# Patient Record
Sex: MALE | Race: WHITE | NOT HISPANIC OR LATINO | Employment: FULL TIME | URBAN - METROPOLITAN AREA
[De-identification: names, ages, dates, MRNs, and addresses within clinical notes are randomized per-mention and may not be internally consistent; named-entity substitution may affect disease eponyms.]

---

## 2017-11-26 ENCOUNTER — HOSPITAL ENCOUNTER (EMERGENCY)
Facility: CLINIC | Age: 47
Discharge: HOME | End: 2017-11-26
Attending: EMERGENCY MEDICINE | Admitting: EMERGENCY MEDICINE
Payer: COMMERCIAL

## 2017-11-26 ENCOUNTER — APPOINTMENT (OUTPATIENT)
Dept: CT IMAGING | Facility: CLINIC | Age: 47
End: 2017-11-26
Attending: EMERGENCY MEDICINE
Payer: COMMERCIAL

## 2017-11-26 VITALS
HEIGHT: 69 IN | OXYGEN SATURATION: 98 % | WEIGHT: 155 LBS | HEART RATE: 105 BPM | RESPIRATION RATE: 16 BRPM | TEMPERATURE: 98.8 F | BODY MASS INDEX: 22.96 KG/M2 | DIASTOLIC BLOOD PRESSURE: 71 MMHG | SYSTOLIC BLOOD PRESSURE: 114 MMHG

## 2017-11-26 DIAGNOSIS — A08.4 VIRAL GASTROENTERITIS: Primary | ICD-10-CM

## 2017-11-26 DIAGNOSIS — R11.2 NON-INTRACTABLE VOMITING WITH NAUSEA, UNSPECIFIED VOMITING TYPE: ICD-10-CM

## 2017-11-26 LAB
ALBUMIN SERPL-MCNC: 3.9 G/DL (ref 3.4–5)
ALP SERPL-CCNC: 46 U/L (ref 40–150)
ALT SERPL W P-5'-P-CCNC: 30 U/L (ref 0–70)
ANION GAP SERPL CALCULATED.3IONS-SCNC: 7 MMOL/L (ref 3–14)
AST SERPL W P-5'-P-CCNC: 15 U/L (ref 0–45)
BASOPHILS # BLD AUTO: 0 10E9/L (ref 0–0.2)
BASOPHILS NFR BLD AUTO: 0.1 %
BILIRUB SERPL-MCNC: 0.5 MG/DL (ref 0.2–1.3)
BUN SERPL-MCNC: 20 MG/DL (ref 7–30)
CALCIUM SERPL-MCNC: 8.5 MG/DL (ref 8.5–10.1)
CHLORIDE SERPL-SCNC: 104 MMOL/L (ref 94–109)
CO2 SERPL-SCNC: 30 MMOL/L (ref 20–32)
CREAT SERPL-MCNC: 0.83 MG/DL (ref 0.66–1.25)
DIFFERENTIAL METHOD BLD: ABNORMAL
EOSINOPHIL # BLD AUTO: 0 10E9/L (ref 0–0.7)
EOSINOPHIL NFR BLD AUTO: 0.2 %
ERYTHROCYTE [DISTWIDTH] IN BLOOD BY AUTOMATED COUNT: 11.7 % (ref 10–15)
GFR SERPL CREATININE-BSD FRML MDRD: >90 ML/MIN/1.7M2
GLUCOSE SERPL-MCNC: 120 MG/DL (ref 70–99)
HCT VFR BLD AUTO: 41 % (ref 40–53)
HGB BLD-MCNC: 14.6 G/DL (ref 13.3–17.7)
IMM GRANULOCYTES # BLD: 0 10E9/L (ref 0–0.4)
IMM GRANULOCYTES NFR BLD: 0.1 %
LIPASE SERPL-CCNC: 281 U/L (ref 73–393)
LYMPHOCYTES # BLD AUTO: 0.4 10E9/L (ref 0.8–5.3)
LYMPHOCYTES NFR BLD AUTO: 3.1 %
MCH RBC QN AUTO: 30.2 PG (ref 26.5–33)
MCHC RBC AUTO-ENTMCNC: 35.6 G/DL (ref 31.5–36.5)
MCV RBC AUTO: 85 FL (ref 78–100)
MONOCYTES # BLD AUTO: 0.7 10E9/L (ref 0–1.3)
MONOCYTES NFR BLD AUTO: 5.3 %
NEUTROPHILS # BLD AUTO: 11.1 10E9/L (ref 1.6–8.3)
NEUTROPHILS NFR BLD AUTO: 91.2 %
PLATELET # BLD AUTO: 209 10E9/L (ref 150–450)
POTASSIUM SERPL-SCNC: 3.4 MMOL/L (ref 3.4–5.3)
PROT SERPL-MCNC: 6.9 G/DL (ref 6.8–8.8)
RBC # BLD AUTO: 4.83 10E12/L (ref 4.4–5.9)
SODIUM SERPL-SCNC: 141 MMOL/L (ref 133–144)
WBC # BLD AUTO: 12.2 10E9/L (ref 4–11)

## 2017-11-26 PROCEDURE — 25000128 H RX IP 250 OP 636: Performed by: EMERGENCY MEDICINE

## 2017-11-26 PROCEDURE — 25000132 ZZH RX MED GY IP 250 OP 250 PS 637: Performed by: EMERGENCY MEDICINE

## 2017-11-26 PROCEDURE — 96375 TX/PRO/DX INJ NEW DRUG ADDON: CPT

## 2017-11-26 PROCEDURE — 80053 COMPREHEN METABOLIC PANEL: CPT | Performed by: EMERGENCY MEDICINE

## 2017-11-26 PROCEDURE — 96374 THER/PROPH/DIAG INJ IV PUSH: CPT

## 2017-11-26 PROCEDURE — 99285 EMERGENCY DEPT VISIT HI MDM: CPT | Mod: 25

## 2017-11-26 PROCEDURE — 83690 ASSAY OF LIPASE: CPT | Performed by: EMERGENCY MEDICINE

## 2017-11-26 PROCEDURE — 96361 HYDRATE IV INFUSION ADD-ON: CPT

## 2017-11-26 PROCEDURE — 74177 CT ABD & PELVIS W/CONTRAST: CPT

## 2017-11-26 PROCEDURE — 85025 COMPLETE CBC W/AUTO DIFF WBC: CPT | Performed by: EMERGENCY MEDICINE

## 2017-11-26 RX ORDER — DICYCLOMINE HCL 20 MG
20 TABLET ORAL ONCE
Status: COMPLETED | OUTPATIENT
Start: 2017-11-26 | End: 2017-11-26

## 2017-11-26 RX ORDER — ONDANSETRON 2 MG/ML
4 INJECTION INTRAMUSCULAR; INTRAVENOUS ONCE
Status: COMPLETED | OUTPATIENT
Start: 2017-11-26 | End: 2017-11-26

## 2017-11-26 RX ORDER — DIPHENHYDRAMINE HYDROCHLORIDE 50 MG/ML
25 INJECTION INTRAMUSCULAR; INTRAVENOUS ONCE
Status: DISCONTINUED | OUTPATIENT
Start: 2017-11-26 | End: 2017-11-26

## 2017-11-26 RX ORDER — ACETAMINOPHEN 325 MG/1
650 TABLET ORAL ONCE
Status: COMPLETED | OUTPATIENT
Start: 2017-11-26 | End: 2017-11-26

## 2017-11-26 RX ORDER — DIPHENHYDRAMINE HYDROCHLORIDE 50 MG/ML
50 INJECTION INTRAMUSCULAR; INTRAVENOUS ONCE
Status: COMPLETED | OUTPATIENT
Start: 2017-11-26 | End: 2017-11-26

## 2017-11-26 RX ORDER — ONDANSETRON 4 MG/1
4 TABLET, ORALLY DISINTEGRATING ORAL EVERY 8 HOURS PRN
Qty: 10 TABLET | Refills: 0 | Status: SHIPPED | OUTPATIENT
Start: 2017-11-26 | End: 2023-03-07

## 2017-11-26 RX ORDER — HALOPERIDOL 5 MG/ML
2.5 INJECTION INTRAMUSCULAR ONCE
Status: DISCONTINUED | OUTPATIENT
Start: 2017-11-26 | End: 2017-11-26

## 2017-11-26 RX ORDER — HALOPERIDOL 5 MG/ML
5 INJECTION INTRAMUSCULAR ONCE
Status: COMPLETED | OUTPATIENT
Start: 2017-11-26 | End: 2017-11-26

## 2017-11-26 RX ORDER — DICYCLOMINE HCL 20 MG
20 TABLET ORAL 2 TIMES DAILY
Qty: 20 TABLET | Refills: 0 | Status: SHIPPED | OUTPATIENT
Start: 2017-11-26 | End: 2017-12-06

## 2017-11-26 RX ORDER — IOPAMIDOL 755 MG/ML
78 INJECTION, SOLUTION INTRAVASCULAR ONCE
Status: COMPLETED | OUTPATIENT
Start: 2017-11-26 | End: 2017-11-26

## 2017-11-26 RX ADMIN — ONDANSETRON 4 MG: 2 INJECTION INTRAMUSCULAR; INTRAVENOUS at 00:55

## 2017-11-26 RX ADMIN — HALOPERIDOL LACTATE 5 MG: 5 INJECTION, SOLUTION INTRAMUSCULAR at 02:18

## 2017-11-26 RX ADMIN — ACETAMINOPHEN 650 MG: 325 TABLET, FILM COATED ORAL at 02:18

## 2017-11-26 RX ADMIN — DIPHENHYDRAMINE HYDROCHLORIDE 50 MG: 50 INJECTION, SOLUTION INTRAMUSCULAR; INTRAVENOUS at 04:39

## 2017-11-26 RX ADMIN — IOPAMIDOL 78 ML: 755 INJECTION, SOLUTION INTRAVENOUS at 05:06

## 2017-11-26 RX ADMIN — SODIUM CHLORIDE 1000 ML: 9 INJECTION, SOLUTION INTRAVENOUS at 04:40

## 2017-11-26 RX ADMIN — DEXTROSE AND SODIUM CHLORIDE 2000 ML: 5; 900 INJECTION, SOLUTION INTRAVENOUS at 00:55

## 2017-11-26 RX ADMIN — DICYCLOMINE HYDROCHLORIDE 20 MG: 20 TABLET ORAL at 04:39

## 2017-11-26 ASSESSMENT — ENCOUNTER SYMPTOMS
DYSURIA: 0
DIARRHEA: 0
VOMITING: 1
ABDOMINAL PAIN: 1
NAUSEA: 1
FEVER: 0

## 2017-11-26 NOTE — ED PROVIDER NOTES
"  History     Chief Complaint:  Nausea & Vomiting    The history is provided by the patient.      Padilla Damon is a 47 year old male who presents via EMS with nausea and vomiting. The patient's 3 year old daughter vomited on him 3 days ago. She has had vomiting and diarrhea since then. Yesterday the patient developed nausea at 1500 and started vomiting at 2000. He vomited several times and now has diffuse abdominal pain. Describes single episode of diarrhea tonight.  No blood in stool.  He denies any fever, dysuria, or other medical concerns. Of note, his symptoms improved after given oral Zofran by EMS.  Denies abdominal surgeries.  Unable to tolerate oral intake due to vomiting.     Allergies:  No known drug allergies      Medications:    The patient is not currently taking any prescribed medications.     Past Medical History:    The patient does not have any past pertinent medical history.     Past Surgical History:    History reviewed. No pertinent surgical history.     Family History:    History reviewed. No pertinent family history.      Social History:  Presents via EMS   Tobacco use: Never  Alcohol use: No  PCP: Physician No Ref-Primary    Marital Status:      Review of Systems   Constitutional: Negative for fever.   Gastrointestinal: Positive for abdominal pain, nausea and vomiting. Negative for diarrhea.   Genitourinary: Negative for dysuria.   All other systems reviewed and are negative.    Physical Exam     Patient Vitals for the past 24 hrs:   BP Temp Temp src Pulse Heart Rate Resp SpO2 Height Weight   11/26/17 0531 114/71 - - - 97 16 98 % - -   11/26/17 0436 122/65 - - 105 - 16 96 % - -   11/26/17 0324 106/77 - - - - - - - -   11/26/17 0222 118/79 - - 96 - 20 98 % - -   11/26/17 0118 108/73 - - 97 - 18 99 % - -   11/26/17 0018 119/81 98.8  F (37.1  C) Oral 88 - 16 94 % 1.753 m (5' 9\") 70.3 kg (155 lb)      Physical Exam  General: Alert, appears well-developed and well-nourished. Cooperative. "     In moderate distress  HEENT:  Head:  Atraumatic  Ears:  External ears are normal  Mouth/Throat:  Oropharynx is without erythema or exudate and mucous membranes are dry.   Eyes:   Conjunctivae normal and EOM are normal. No scleral icterus.    Pupils are equal, round, and reactive to light.   CV:  Normal rate, regular rhythm, normal heart sounds and radial and dorsalis pedis pulses are 2+ and symmetric.  No murmur.  Resp:  Breath sounds are clear bilaterally    Non-labored, no retractions or accessory muscle use  GI:  Abdomen is soft, no distension, no tenderness. No rebound or guarding. No CVA tenderness bilaterally  MS:  Normal range of motion. No edema.    Normal strength in all 4 extremities.     Back atraumatic.  Skin:  Warm and dry.  No rash or lesions noted.  Neuro: Alert. Normal strength.  Sensation intact in all 4 extremities. GCS: 15  Psych:  Normal mood and affect.  Lymph: No anterior or posterior cervical lymphadenopathy noted.       Emergency Department Course   Imaging:  Radiographic findings were communicated with the patient who voiced understanding of the findings.  CT Abdomen Pelvis w Contrast  IMPRESSION:  1. Increased small bowel and colonic fluid is nonspecific but may be due to enteritis.  2. No other acute findings.    GUCCI BRADY MD    Imaging independently reviewed and agree with radiologist interpretation.     Laboratory:  CBC: WBC 12.2 (H), o/w WNL (HGB 14.6, )   CMP: Glucose 120 (H), o/w WNL (Creatinine 0.83)  Lipase: 281     Interventions:  0055: Dextrose 5% and 0.9% NaCl Bolus 2000 mLs  0055: Zofran 4mg IV injection    0218: Haldol 5 mg IV  0218: Tylenol 650 mg PO  0439: Bentyl 20 mg PO  0439: Benadryl 50 mg IV  0440: NS 1L IV Bolus     Emergency Department Course:  Past medical records, nursing notes, and vitals reviewed.  0028: I performed an exam of the patient and obtained history, as documented above.    0213: I rechecked the patient. Explained findings to the  "patient.     0540: I rechecked the patient. Findings and plan explained to the Patient. Patient discharged home with instructions regarding supportive care, medications, and reasons to return. The importance of close follow-up was reviewed.      I personally reviewed the laboratory results with the patient and answered all related questions prior to discharge.   Impression & Plan    Medical Decision Making:  Padilla Damon is a 47 year old male who presents with significant vomiting since 1500 yesterday and vomiting since 1830 yesterday evening. Patient also describes some mild diarrhea and one child with similar norovirus symptoms at home. I suspect this is most likely a viral gastroenteritis. Patient has crampy abdominal discomfort throughout his abdomen. Patient has a long course while here in the ED due to continued nausea and abdominal discomfort. We provided D5 normal saline and Zofran initially. Patient had continued abdominal cramping. We then progressed to haldol for possible second line agent for the abdominal discomfort. Patient had some mild akathisia and restless leg with the haldol administration. Benadryl was given subsequently for a possible side effect. Patient attempted to take Bentyl for some abdominal cramping as a third line agent and something he possibly could go home with assuming he has improved symptoms. He did have some mild improvement with the Bentyl and was able to tolerate PO here in the ED.     Patient had significant abdominal pain and nausea during his stay in the ED but no vomiting witnessed by me, the nurse, or the tech during his stay in the ED for approximately 5 hours. Due to my inability to encourage the patient to initially eat food and fluids and his persistence that this abdominal pain was the \"worst pain he's ever had,\" I did obtain a CT scan out of concern for possible SBO vs diverticulitis as his pain is diffuse and associated with mild guarding on my re-check.  CT does " show likely enteritis. No evidence of a SBO or diverticulitis.  This was reassuring and with a negative CT for acute surgical process, patient was significantly encouraged to tolerate oral intake. He was able to do so and patient was given Zofran and bentyl for at home use for continued suspected viral gastroenteritis symptoms. He understood return precautions if he develops fever, inability to tolerate PO, or has worsening or progressive symptoms. Encourage to maintain proper hand hygiene and understood that the remainder of the family may have similar symptoms in the next 24-48 hours. They should follow-up with their PCP if they develop similar symptoms. Discharge to home after all questions were answered.    Diagnosis:    ICD-10-CM   1. Viral gastroenteritis    2. Non-intractable vomiting with nausea, unspecified vomiting type R11.2     Disposition:  Discharged to home with plan as outlined.    Discharge Medications:  New Prescriptions    DICYCLOMINE (BENTYL) 20 MG TABLET    Take 1 tablet (20 mg) by mouth 2 times daily for 10 days    ONDANSETRON (ZOFRAN ODT) 4 MG ODT TAB    Take 1 tablet (4 mg) by mouth every 8 hours as needed for nausea         Carlos Smith  11/26/2017    EMERGENCY DEPARTMENT  ICarols, am serving as a scribe at 12:28 AM on 11/26/2017 to document services personally performed by Owen Godinez MD based on my observations and the provider's statements to me.       Owen Godinez MD  11/26/17 5946

## 2017-11-26 NOTE — ED AVS SNAPSHOT
Emergency Department    [address] [place] [address]    Phone:  [phone]    Fax:  [phone]                                       Padilla Damon   MRN: [id number]    Department:   Emergency Department   Date of Visit:  11/26/2017           After Visit Summary Signature Page     I have received my discharge instructions, and my questions have been answered. I have discussed any challenges I see with this plan with the nurse or doctor.    ..........................................................................................................................................  Patient/Patient Representative Signature      ..........................................................................................................................................  Patient Representative Print Name and Relationship to Patient    ..................................................               ................................................  Date                                            Time    ..........................................................................................................................................  Reviewed by Signature/Title    ...................................................              ..............................................  Date                                                            Time

## 2017-11-26 NOTE — DISCHARGE INSTRUCTIONS
Discharge Instructions  Gastroenteritis    You have been seen today for vomiting (throwing up) and diarrhea (loose stools), called gastroenteritis or the stomach flu. This is usually caused by a virus, but some bacteria, parasites, medicines or other medical conditions can cause similar symptoms. At this time your provider does not find that your vomiting and diarrhea is a sign of anything dangerous or life-threatening.  However, sometimes the signs of serious illness do not show up right away. Remember that serious problems like appendicitis can look like gastroenteritis at first.       Generally, every Emergency Department visit should have a follow-up clinic visit with either a primary or a specialty clinic/provider. Please follow-up as instructed by your emergency provider today.    Return to the Emergency Department if:    You keep vomiting and you are not able to keep liquids down.    You feel you are getting dehydrated, such as being very thirsty, not urinating (peeing), or feeling faint or lightheaded.     You develop a new fever.    You have abdominal (belly) pain that seems worse than cramps, is in one spot, or is getting worse over time.     You have blood in your vomit or in your diarrhea.    You feel very weak.    What can I do to help myself?    The most important thing to do is to drink clear liquids.  If you have been vomiting a lot, it is best to have only small, frequent sips of liquids.  Drinking too much at once may cause more vomiting. Water is a good first option for rehydration. If you are vomiting often, you must also replace electrolytes (salts and minerals) lost with your illness. Pedialyte  is the best rehydration liquid but many don t like the taste so sports drinks (like Gatorade ) are a good option. Sodas and juice are also options but are high in sugar. Avoid acid liquids (orange), caffeine (coffee) or alcohol. Do not drink milk until you no longer have diarrhea.    After liquids are  staying down, you may start eating mild foods. Soda crackers, toast, plain noodles, gelatin, applesauce and bananas are good first choices.  Avoid foods that have acid, are spicy, fatty or fibrous (such as meats, coarse grains, vegetables). You may start eating these foods again in about 3 days when you are better.    Sometimes treatment includes prescription medicine to prevent nausea (sick to your stomach) and vomiting and to prevent diarrhea. If your provider prescribes these for you, take them as directed.    Nonprescription medicine is available for the treatment of diarrhea and can be very effective.  If you use it, make sure you use the dose recommended on the package. Avoid Lomotil . Check with your healthcare provider before you use any medicine for diarrhea.    Do not take ibuprofen, or other nonsteroidal anti-inflammatory medicines without checking with your healthcare provider.  If you were given a prescription for medicine here today, be sure to read all of the information (including the package insert) that comes with your prescription.  This will include important information about the medicine, its side effects, and any warnings that you need to know about.  The pharmacist who fills the prescription can provide more information and answer questions you may have about the medicine.  If you have questions or concerns that the pharmacist cannot address, please call or return to the Emergency Department.   Remember that you can always come back to the Emergency Department if you are not able to see your regular provider in the amount of time listed above, if you get any new symptoms, or if there is anything that worries you.

## 2017-11-26 NOTE — ED AVS SNAPSHOT
Emergency Department    [address] [facility] [address]    Phone:  [phone]    Fax:  [phone]                                       Padilla Damon   MRN: [id number]    Department:   Emergency Department   Date of Visit:  11/26/2017           Patient Information     Date Of Birth          1970        Your diagnoses for this visit were:     Non-intractable vomiting with nausea, unspecified vomiting type     Viral gastroenteritis        You were seen by Owen Godinez MD.      Follow-up Information     Schedule an appointment as soon as possible for a visit with Vasyl Bo MD.    Specialty:  Family Practice    Why:  to establish primary care provider and follow up from today's ED visit if needed    Contact information:    [address] [address] [phone]          Follow up with  Emergency Department.    Specialty:  EMERGENCY MEDICINE    Why:  If symptoms worsen    Contact information:    [address] [facility] [address] [phone]        Discharge Instructions       Discharge Instructions  Gastroenteritis    You have been seen today for vomiting (throwing up) and diarrhea (loose stools), called gastroenteritis or the stomach flu. This is usually caused by a virus, but some bacteria, parasites, medicines or other medical conditions can cause similar symptoms. At this time your provider does not find that your vomiting and diarrhea is a sign of anything dangerous or life-threatening.  However, sometimes the signs of serious illness do not show up right away. Remember that serious problems like appendicitis can look like gastroenteritis at first.       Generally, every Emergency Department visit should have a follow-up clinic visit with either a primary or a specialty clinic/provider. Please follow-up as instructed by your emergency provider today.    Return to the Emergency Department if:    You keep vomiting and you are not able to  keep liquids down.    You feel you are getting dehydrated, such as being very thirsty, not urinating (peeing), or feeling faint or lightheaded.     You develop a new fever.    You have abdominal (belly) pain that seems worse than cramps, is in one spot, or is getting worse over time.     You have blood in your vomit or in your diarrhea.    You feel very weak.    What can I do to help myself?    The most important thing to do is to drink clear liquids.  If you have been vomiting a lot, it is best to have only small, frequent sips of liquids.  Drinking too much at once may cause more vomiting. Water is a good first option for rehydration. If you are vomiting often, you must also replace electrolytes (salts and minerals) lost with your illness. Pedialyte  is the best rehydration liquid but many don t like the taste so sports drinks (like Gatorade ) are a good option. Sodas and juice are also options but are high in sugar. Avoid acid liquids (orange), caffeine (coffee) or alcohol. Do not drink milk until you no longer have diarrhea.    After liquids are staying down, you may start eating mild foods. Soda crackers, toast, plain noodles, gelatin, applesauce and bananas are good first choices.  Avoid foods that have acid, are spicy, fatty or fibrous (such as meats, coarse grains, vegetables). You may start eating these foods again in about 3 days when you are better.    Sometimes treatment includes prescription medicine to prevent nausea (sick to your stomach) and vomiting and to prevent diarrhea. If your provider prescribes these for you, take them as directed.    Nonprescription medicine is available for the treatment of diarrhea and can be very effective.  If you use it, make sure you use the dose recommended on the package. Avoid Lomotil . Check with your healthcare provider before you use any medicine for diarrhea.    Do not take ibuprofen, or other nonsteroidal anti-inflammatory medicines without checking with your  healthcare provider.  If you were given a prescription for medicine here today, be sure to read all of the information (including the package insert) that comes with your prescription.  This will include important information about the medicine, its side effects, and any warnings that you need to know about.  The pharmacist who fills the prescription can provide more information and answer questions you may have about the medicine.  If you have questions or concerns that the pharmacist cannot address, please call or return to the Emergency Department.   Remember that you can always come back to the Emergency Department if you are not able to see your regular provider in the amount of time listed above, if you get any new symptoms, or if there is anything that worries you.      Discharge References/Attachments     NOROVIRUS, UNDERSTANDING (ENGLISH)      24 Hour Appointment Hotline       To make an appointment at any [facility], call [phone] ([phone]). If you don't have a family doctor or clinic, we will help you find one. [facility] clinics are conveniently located to serve the needs of you and your family.             Review of your medicines      START taking        Dose / Directions Last dose taken    dicyclomine 20 MG tablet   Commonly known as:  BENTYL   Dose:  20 mg   Quantity:  20 tablet        Take 1 tablet (20 mg) by mouth 2 times daily for 10 days   Refills:  0        ondansetron 4 MG ODT tab   Commonly known as:  ZOFRAN ODT   Dose:  4 mg   Quantity:  10 tablet        Take 1 tablet (4 mg) by mouth every 8 hours as needed for nausea   Refills:  0                Prescriptions were sent or printed at these locations (2 Prescriptions)                   Other Prescriptions                Printed at Department/Unit printer (2 of 2)         ondansetron (ZOFRAN ODT) 4 MG ODT tab               dicyclomine (BENTYL) 20 MG tablet                Procedures and tests performed during your visit     CBC  with platelets differential    CT Abdomen Pelvis w Contrast    Comprehensive metabolic panel    Lipase      Orders Needing Specimen Collection     None      Pending Results     No orders found from 11/24/2017 to 11/27/2017.            Pending Culture Results     No orders found from 11/24/2017 to 11/27/2017.            Pending Results Instructions     If you had any lab results that were not finalized at the time of your Discharge, you can call the ED Lab Result RN at [phone]. You will be contacted by this team for any positive Lab results or changes in treatment. The nurses are available 7 days a week from 10A to 6:30P.  You can leave a message 24 hours per day and they will return your call.        Test Results From Your Hospital Stay        11/26/2017  1:08 AM      Component Results     Component Value Ref Range & Units Status    WBC 12.2 (H) 4.0 - 11.0 10e9/L Final    RBC Count 4.83 4.4 - 5.9 10e12/L Final    Hemoglobin 14.6 13.3 - 17.7 g/dL Final    Hematocrit 41.0 40.0 - 53.0 % Final    MCV 85 78 - 100 fl Final    MCH 30.2 26.5 - 33.0 pg Final    MCHC 35.6 31.5 - 36.5 g/dL Final    RDW 11.7 10.0 - 15.0 % Final    Platelet Count 209 150 - 450 10e9/L Final    Diff Method Automated Method  Final    % Neutrophils 91.2 % Final    % Lymphocytes 3.1 % Final    % Monocytes 5.3 % Final    % Eosinophils 0.2 % Final    % Basophils 0.1 % Final    % Immature Granulocytes 0.1 % Final    Absolute Neutrophil 11.1 (H) 1.6 - 8.3 10e9/L Final    Absolute Lymphocytes 0.4 (L) 0.8 - 5.3 10e9/L Final    Absolute Monocytes 0.7 0.0 - 1.3 10e9/L Final    Absolute Eosinophils 0.0 0.0 - 0.7 10e9/L Final    Absolute Basophils 0.0 0.0 - 0.2 10e9/L Final    Abs Immature Granulocytes 0.0 0 - 0.4 10e9/L Final         11/26/2017  1:26 AM      Component Results     Component Value Ref Range & Units Status    Sodium 141 133 - 144 mmol/L Final    Potassium 3.4 3.4 - 5.3 mmol/L Final    Chloride 104 94 - 109 mmol/L Final    Carbon Dioxide 30  20 - 32 mmol/L Final    Anion Gap 7 3 - 14 mmol/L Final    Glucose 120 (H) 70 - 99 mg/dL Final    Urea Nitrogen 20 7 - 30 mg/dL Final    Creatinine 0.83 0.66 - 1.25 mg/dL Final    GFR Estimate >90 >60 mL/min/1.7m2 Final    Non  GFR Calc    GFR Estimate If Black >90 >60 mL/min/1.7m2 Final    African American GFR Calc    Calcium 8.5 8.5 - 10.1 mg/dL Final    Bilirubin Total 0.5 0.2 - 1.3 mg/dL Final    Albumin 3.9 3.4 - 5.0 g/dL Final    Protein Total 6.9 6.8 - 8.8 g/dL Final    Alkaline Phosphatase 46 40 - 150 U/L Final    ALT 30 0 - 70 U/L Final    AST 15 0 - 45 U/L Final         11/26/2017  1:23 AM      Component Results     Component Value Ref Range & Units Status    Lipase 281 73 - 393 U/L Final         11/26/2017  5:27 AM      Narrative      CT ABDOMEN PELVIS WITH CONTRAST 11/26/2017 5:08 AM     HISTORY: Abdominal pain, diffuse generalized pain, concern for small  bowel obstruction, significant vomiting.     TECHNIQUE: Volumetric acquisition through abdomen and pelvis with  IV  contrast.  78mL Isouve-370  Radiation dose for this scan was reduced  using automated exposure control, adjustment of the mA and/or kV  according to patient size, or iterative reconstruction technique.    COMPARISON: None.    FINDINGS: The liver, gallbladder, spleen, pancreas, adrenal glands and  kidneys demonstrate no worrisome findings. No hydronephrosis. Two tiny  low-density foci in the liver are too small to characterize but of  doubtful significance.    Increased fluid within the lumen of mid to distal small bowel loops  and the right colon. Small bowel loops are mildly distended but there  is no convincing bowel obstruction. No bowel wall thickening or  pneumatosis. Negative appendix.    No suspicious pelvic masses. Urinary bladder is normal. No ascites or  free air.        Impression     IMPRESSION:  1. Increased small bowel and colonic fluid is nonspecific but may be  due to enteritis.  2. No other acute  findings.    GUCCI BRADY MD                Clinical Quality Measure: Blood Pressure Screening     Your blood pressure was checked while you were in the emergency department today. The last reading we obtained was  BP: 114/71 . Please read the guidelines below about what these numbers mean and what you should do about them.  If your systolic blood pressure (the top number) is less than 120 and your diastolic blood pressure (the bottom number) is less than 80, then your blood pressure is normal. There is nothing more that you need to do about it.  If your systolic blood pressure (the top number) is 120-139 or your diastolic blood pressure (the bottom number) is 80-89, your blood pressure may be higher than it should be. You should have your blood pressure rechecked within a year by a primary care provider.  If your systolic blood pressure (the top number) is 140 or greater or your diastolic blood pressure (the bottom number) is 90 or greater, you may have high blood pressure. High blood pressure is treatable, but if left untreated over time it can put you at risk for heart attack, stroke, or kidney failure. You should have your blood pressure rechecked by a primary care provider within the next 4 weeks.  If your provider in the emergency department today gave you specific instructions to follow-up with your doctor or provider even sooner than that, you should follow that instruction and not wait for up to 4 weeks for your follow-up visit.        Thank you for choosing [facility]       Thank you for choosing [facility] for your care. Our goal is always to provide you with excellent care. Hearing back from our patients is one way we can continue to improve our services. Please take a few minutes to complete the written survey that you may receive in the mail after you visit with us. Thank you!        [facility] Information     [facility] lets you send messages to your doctor, view your test results, renew your  "prescriptions, schedule appointments and more. To sign up, go to [email or web address] . Click on \"Log in\" on the left side of the screen, which will take you to the Welcome page. Then click on \"Sign up Now\" on the right side of the page.     You will be asked to enter the access code listed below, as well as some personal information. Please follow the directions to create your username and password.     Your access code is: [email or web address]  Expires: 2018  5:39 AM     Your access code will  in 90 days. If you need help or a new code, please call your [facility] clinic or [phone].        Care EveryWhere ID     This is your Care EveryWhere ID. This could be used by other organizations to access your [facility] medical records  [id number]        Equal Access to Services     YUDELKA KEITH : Lashaun Nguyễn, faisal menjivar, cassie brewer, bart lanza . So [facility] [phone].    ATENCIÓN: Si habla español, tiene a cruz disposición servicios gratuitos de asistencia lingüística. Llame al [phone].    We comply with applicable federal civil rights laws and [place] laws. We do not discriminate on the basis of race, color, national origin, age, disability, sex, sexual orientation, or gender identity.            After Visit Summary       This is your record. Keep this with you and show to your community pharmacist(s) and doctor(s) at your next visit.                  "

## 2018-06-15 NOTE — TELEPHONE ENCOUNTER
FUTURE VISIT INFORMATION      FUTURE VISIT INFORMATION:    Date: 6/19/18    Time: 2:45    Location:   REFERRAL INFORMATION:    Referring provider:  Self    Referring providers clinic:      Reason for visit/diagnosis: Lt knee pain/injury

## 2018-06-19 ENCOUNTER — RADIANT APPOINTMENT (OUTPATIENT)
Dept: GENERAL RADIOLOGY | Facility: CLINIC | Age: 48
End: 2018-06-19
Payer: COMMERCIAL

## 2018-06-19 ENCOUNTER — OFFICE VISIT (OUTPATIENT)
Dept: ORTHOPEDICS | Facility: CLINIC | Age: 48
End: 2018-06-19
Payer: COMMERCIAL

## 2018-06-19 ENCOUNTER — PRE VISIT (OUTPATIENT)
Dept: ORTHOPEDICS | Facility: CLINIC | Age: 48
End: 2018-06-19

## 2018-06-19 VITALS — WEIGHT: 156 LBS | HEIGHT: 69 IN | RESPIRATION RATE: 16 BRPM | BODY MASS INDEX: 23.11 KG/M2

## 2018-06-19 DIAGNOSIS — M25.562 LEFT KNEE PAIN, UNSPECIFIED CHRONICITY: Primary | ICD-10-CM

## 2018-06-19 DIAGNOSIS — M25.562 LEFT KNEE PAIN, UNSPECIFIED CHRONICITY: ICD-10-CM

## 2018-06-19 DIAGNOSIS — M25.862 PATELLOFEMORAL DYSFUNCTION OF LEFT KNEE: Primary | ICD-10-CM

## 2018-06-19 NOTE — LETTER
Date:June 21, 2018      Patient was self referred, no letter generated. Do not send.        [facility] Information

## 2018-06-19 NOTE — MR AVS SNAPSHOT
"              After Visit Summary   2018    Padilla Damon    MRN: [id number]           Patient Information     Date Of Birth          1970        Visit Information        Provider Department      2018 2:45 PM Rodrick Elder MD [facility]        Today's Diagnoses     Patellofemoral dysfunction of left knee    -  1       Follow-ups after your visit        Additional Services     PHYSICAL THERAPY REFERRAL (Internal)       Physical Therapy Referral                  Who to contact     Please call your clinic at [phone] to:    Ask questions about your health    Make or cancel appointments    Discuss your medicines    Learn about your test results    Speak to your doctor            Additional Information About Your Visit        MyChart Information     [employer] is an electronic gateway that provides easy, online access to your medical records. With [employer], you can request a clinic appointment, read your test results, renew a prescription or communicate with your care team.     To sign up for [facility] [employer] visit the website at [email or web address]   You will be asked to enter the access code listed below, as well as some personal information. Please follow the directions to create your username and password.     Your access code is: [email or web address]  Expires: 2018  6:30 AM     Your access code will  in 90 days. If you need help or a new code, please contact your [facility] or call [phone] for assistance.        Care EveryWhere ID     This is your Care EveryWhere ID. This could be used by other organizations to access your [facility] medical records  [id number]        Your Vitals Were     Respirations Height BMI (Body Mass Index)             16 5' 9\" (1.753 m) 23.04 kg/m2          Blood Pressure from Last 3 Encounters:   17 114/71    Weight from Last 3 Encounters:   18 156 lb (70.8 kg)   17 155 lb (70.3 kg)              We " Performed the Following     PHYSICAL THERAPY REFERRAL (Internal)        Primary Care Provider Fax #    Physician No Ref-Primary [phone]       No address on file        Equal Access to Services     YUDELKA KEITH : Hadii aad ku hadabdirahmanriley Nguyễn, janetkarina shentrentonha, cassie mengke brewer, bart oates. So [facility] [phone].    ATENCIÓN: Si habla español, tiene a cruz disposición servicios gratuitos de asistencia lingüística. Llame al [phone].    We comply with applicable federal civil rights laws and [place] laws. We do not discriminate on the basis of race, color, national origin, age, disability, sex, sexual orientation, or gender identity.            Thank you!     Thank you for choosing [facility]  for your care. Our goal is always to provide you with excellent care. Hearing back from our patients is one way we can continue to improve our services. Please take a few minutes to complete the written survey that you may receive in the mail after your visit with us. Thank you!             Your Updated Medication List - Protect others around you: Learn how to safely use, store and throw away your medicines at www.disposemymeds.org.          This list is accurate as of 6/19/18 11:59 PM.  Always use your most recent med list.                   Brand Name Dispense Instructions for use Diagnosis    ondansetron 4 MG ODT tab    ZOFRAN ODT    10 tablet    Take 1 tablet (4 mg) by mouth every 8 hours as needed for nausea

## 2018-06-19 NOTE — LETTER
6/19/2018      RE: Padilla Damon  [address] [place] [address]        Subjective:   Padilla Damon is a 48 year old male who presents with left medial and posterior knee pain. He likes to do taekwondo for past 12 years. After sparring he noted pain. 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    Padilla has been practicing tae julien do for the past 12 years.  He did have some on and off moments as he was moving from the [facility] to the [facility] and had started with some karate but then transitioned back to tae julien do, as he was able to find a gym close to his home.  He states that about 4 weeks ago he was sparring, and after the sparring he noted he was having some left knee discomfort.  He had utilize ice, Tylenol and ibuprofen, all of which helped but did not resolve his symptoms.  About 2 weeks later he noted it still hurt, so he then stopped his workouts.  He was having discomfort with stairs and repeated knee flexion.  Radiographs today are negative.  He notes that he has discomfort along the anteromedial knee and can also have some aching toward the posterior aspect of the knee at times.  He has no locking, no swelling, and no instability.     Background:   Date of injury: May 2018   Duration of symptoms: 4 weeks  Mechanism of Injury: Acute; Activity Related Taekwondo   Aggravating factors: Twisting, stairs, walking   Relieving Factors: rest, ibuprofen, tylenol and ice  Prior Evaluation: None    PAST MEDICAL, SOCIAL, SURGICAL AND FAMILY HISTORY: He  has no past medical history on file.  He  has no past surgical history on file.  His family history is not on file.  He reports that he has never smoked. He has never used smokeless tobacco. He reports that he does not drink alcohol or use illicit drugs.    ALLERGIES: He has No Known Allergies.    CURRENT MEDICATIONS: He has a current medication list which includes the following prescription(s): ondansetron.     REVIEW OF SYSTEMS: 12 point review of systems is  "negative except as noted above.     Exam:   Resp 16  Ht 5' 9\" (1.753 m)  Wt 156 lb (70.8 kg)  BMI 23.04 kg/m2      CONSTITUTIONAL: healthy, alert and no distress  HEAD: Normocephalic. No masses, lesions, tenderness or abnormalities  SKIN: no suspicious lesions or rashes  GAIT: normal  NEUROLOGIC: Non-focal  PSYCHIATRIC: affect normal/bright and mentation appears normal.    MUSCULOSKELETAL:   LEFT KNEE: Comprehensive knee examination demonstrates FROM, (-) effusion, (++) medial/lateral patellar facet tenderness, (+) patellar inhibition, (-) apprehension; (-) pain or laxity with valgus stress testing in 0 or 30 degrees of knee flexion, (-) pain or laxity with varus stress testing in 0 or 30 degrees of knee flexion, (-) lachman, (-) pivot shift, (-) PD; (-) medial joint line tenderness, (-) lateral joint line tenderness, (-) bounce test, (-) Jass test.     Assessment/Plan:   (J27.037) Patellofemoral dysfunction of left knee  (primary encounter diagnosis)  Comment: To PT for pelvifemoral rehab. Placed in a left PF brace with a lateral buttress to use during exercise and will continue its use until pain free exercise for approx one month. No follow up planned unless he is not doing well. He may continue with his exercise and day victoria do while undertaking rehab.          Rodrick Elder MD    "

## 2018-06-19 NOTE — PROGRESS NOTES
" Subjective:   Padilla Damon is a 48 year old male who presents with left medial and posterior knee pain. He likes to do taekwondo for past 12 years. After sparring he noted pain. 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  He did have some on and off moments as he was moving from the [facility] to the [facility] and had started with some karate but then transitioned back to ta[employer]er Pages Onlineon do, as he was able to find a gym close to his home.  He states that about 4 weeks ago he was sparring, and after the sparring he noted he was having some left knee discomfort.  He had utilize ice, Tylenol and ibuprofen, all of which helped but did not resolve his symptoms.  About 2 weeks later he noted it still hurt, so he then stopped his workouts.  He was having discomfort with stairs and repeated knee flexion.  Radiographs today are negative.  He notes that he has discomfort along the anteromedial knee and can also have some aching toward the posterior aspect of the knee at times.  He has no locking, no swelling, and no instability.     Background:   Date of injury: May 2018   Duration of symptoms: 4 weeks  Mechanism of Injury: Acute; Activity Related Taekwondo   Aggravating factors: Twisting, stairs, walking   Relieving Factors: rest, ibuprofen, tylenol and ice  Prior Evaluation: None    PAST MEDICAL, SOCIAL, SURGICAL AND FAMILY HISTORY: He  has no past medical history on file.  He  has no past surgical history on file.  His family history is not on file.  He reports that he has never smoked. He has never used smokeless tobacco. He reports that he does not drink alcohol or use illicit drugs.    ALLERGIES: He has No Known Allergies.    CURRENT MEDICATIONS: He has a current medication list which includes the following prescription(s): ondansetron.     REVIEW OF SYSTEMS: 12 point review of systems is negative except as noted above.     Exam:   Resp 16  Ht 5' 9\" (1.753 m)  " Wt 156 lb (70.8 kg)  BMI 23.04 kg/m2      CONSTITUTIONAL: healthy, alert and no distress  HEAD: Normocephalic. No masses, lesions, tenderness or abnormalities  SKIN: no suspicious lesions or rashes  GAIT: normal  NEUROLOGIC: Non-focal  PSYCHIATRIC: affect normal/bright and mentation appears normal.    MUSCULOSKELETAL:   LEFT KNEE: Comprehensive knee examination demonstrates FROM, (-) effusion, (++) medial/lateral patellar facet tenderness, (+) patellar inhibition, (-) apprehension; (-) pain or laxity with valgus stress testing in 0 or 30 degrees of knee flexion, (-) pain or laxity with varus stress testing in 0 or 30 degrees of knee flexion, (-) lachman, (-) pivot shift, (-) PD; (-) medial joint line tenderness, (-) lateral joint line tenderness, (-) bounce test, (-) Jass test.     Assessment/Plan:   (N35.844) Patellofemoral dysfunction of left knee  (primary encounter diagnosis)  Comment: To PT for pelvifemoral rehab. Placed in a left PF brace with a lateral buttress to use during exercise and will continue its use until pain free exercise for approx one month. No follow up planned unless he is not doing well. He may continue with his exercise and day victoria do while undertaking rehab.

## 2018-07-24 ENCOUNTER — THERAPY VISIT (OUTPATIENT)
Dept: PHYSICAL THERAPY | Facility: CLINIC | Age: 48
End: 2018-07-24
Attending: FAMILY MEDICINE
Payer: COMMERCIAL

## 2018-07-24 DIAGNOSIS — M99.05 SEGMENTAL AND SOMATIC DYSFUNCTION OF PELVIC REGION: ICD-10-CM

## 2018-07-24 DIAGNOSIS — M22.2X2 PATELLOFEMORAL SYNDROME OF LEFT KNEE: Primary | ICD-10-CM

## 2018-07-24 PROCEDURE — 97162 PT EVAL MOD COMPLEX 30 MIN: CPT | Mod: GP | Performed by: PHYSICAL THERAPIST

## 2018-07-24 PROCEDURE — 97140 MANUAL THERAPY 1/> REGIONS: CPT | Mod: GP | Performed by: PHYSICAL THERAPIST

## 2018-07-24 PROCEDURE — 97112 NEUROMUSCULAR REEDUCATION: CPT | Mod: GP | Performed by: PHYSICAL THERAPIST

## 2018-07-24 NOTE — PROGRESS NOTES
[facility] Initial Evaluation  Subjective:  Patient is a 48 year old male presenting with rehab left knee hpi. The history is provided by the patient. No  was used.   Padilla Damon is a 48 year old male with a left knee condition.  Condition occurred with:  Contact with another person and contact with object (taekwondo sparring and running into a baby barrier).  Condition occurred: during recreation/sport and at home.  This is a new condition  05/24/18 original injury and then re-injured a few days ago when he was kicked in lateral thigh on left while sparring and ran into the baby gate..    Patient reports pain:  Anterior (anterior knee and after sitting for a few minutes stiffness in back of knee).  Radiates to: sometimes back of thigh and upper calf after sitting.  Quality: After sitting when first rising it will be extreme pain but after 30 seconds it is dull.  and is constant and reported as 6/10.  Associated symptoms:  Loss of motion/stiffness and edema. Pain is worse during the day.  Symptoms are exacerbated by sitting, certain positions and bending/squatting Relieved by: bracing for short periods, movement for awhile, ice sometimes.  Pain course: worse since re-injury.  Special tests:  X-ray.    Improvement with previous treatment: Haven't had treatment other than brace.  General health as reported by patient is excellent.  Pertinent medical history includes:  None.  Medical allergies: no.    Current medications:  None as reported by the patient.  Current occupation is Non-profit executive.    Primary job tasks include:  Prolonged sitting and repetitive tasks (computer work).    Barriers include:  None as reported by the patient.    Red flags:  None as reported by the patient.                        Objective:  Standing Alignment:    Cervical/Thoracic:  Forward head and thoracic kyphosis increased    Lumbar:  Posterior pelvic tilt  Pelvic:  Iliac crest high  "L                         Lumbar/SI Evaluation                      SI joint/Sacrum:            Sacral conclusion left:  Posterior inominate                                         Knee Evaluation:  ROM:    AROM      Extension:  Left: 0    Right:  0  Flexion: Left: 135    Right: 135          Ligament Testing:    Varus 0:  Left:  Neg   Right:  Neg        Anterior Drawer:  Left:  Neg    Right:  Neg  Posterior Drawer: Left:  Neg  Right:  Neg  Lachman's:  Left:  Neg  Right:  Neg  Special Tests:   Left knee positive for the following special tests:  Patellar Compression (painful but no crepitus ilicited); Salome's and IT Band Friction  Left knee negative for the following special tests:  Plica; Meninscal; Asterisk Sign; Rotary Instability-Anterior Medial; Rotary Instability-Anterior Lateral; Rotary Instability-Posterior Medial and Rotary Instability-Posterior Lateral  Right knee positive for the following tests:  Patellar Compression (crepitus); Salome's and IT Band Friction  Right knee negative for the following special tests:  Plica; Meniscal; Rotary Instability-Anterior Medial; Rotary Instability-Anterior Lateral; Rotary Instability-Posterior Medial and Rotary Instability-Posterior Lateral  Palpation:  Palpation of knee: Extremely tight hamstring and lateral hip rotators on the left.  Left knee tenderness present at:  Medial Joint Line; IT Band; Biceps Femoral; Semitendinosus; Semembranosus and Gluteus Medius  Left knee tenderness not present at:  Lateral Joint Line and Patellar Tendon    Right knee tenderness not present at:  Medial Joint Line; Lateral Joint Line; Patellar Tendon; IT Band; Biceps Femoral and Semitendinosus  Edema:    Circumference:      Joint Line:  Left:  15 1/4\"   Right:  14 1/2\"            General     ROS    Assessment/Plan:    Patient is a 48 year old male with left side knee complaints.    Patient has the following significant findings with corresponding treatment plan.                Diagnosis 1:  " Left knee pain  Pain -  hot/cold therapy, manual therapy, splint/taping/bracing/orthotics, education and home program  Impaired balance - neuro re-education, therapeutic activities and home program  Edema - vasopneumatics, electric stimulation, cryocuff and self management/home program  Decreased function - therapeutic activities and home program  Diagnosis 2:  Segmental and somatic pelvic dysfunction   Pain -  hot/cold therapy, manual therapy, self management, education and home program  Decreased joint mobility - manual therapy, therapeutic exercise, therapeutic activity and home program  Impaired balance - neuro re-education, therapeutic activities and home program  Decreased proprioception - neuro re-education, therapeutic activities and home program  Impaired gait - gait training and home program  Decreased function - therapeutic activities and home program    Therapy Evaluation Codes:   1) History comprised of:   Personal factors that impact the plan of care:      Past/current experiences and Time since onset of symptoms.    Comorbidity factors that impact the plan of care are:      None.     Medications impacting care: None.  2) Examination of Body Systems comprised of:   Body structures and functions that impact the plan of care:      Ankle, Knee, Pelvis and Sacral illiac joint.   Activity limitations that impact the plan of care are:      Bending, Jumping, Sitting, Sports and Squatting/kneeling.  3) Clinical presentation characteristics are:   Evolving/Changing.  4) Decision-Making    Low complexity using standardized patient assessment instrument and/or measureable assessment of functional outcome.  Cumulative Therapy Evaluation is: Low complexity.    Previous and current functional limitations:  (See Goal Flow Sheet for this information)    Short term and Long term goals: (See Goal Flow Sheet for this information)     Communication ability:  Patient appears to be able to clearly communicate and understand  verbal and written communication and follow directions correctly.  Treatment Explanation - The following has been discussed with the patient:   RX ordered/plan of care  Anticipated outcomes  Possible risks and side effects  This patient would benefit from PT intervention to resume normal activities.   Rehab potential is excellent.    Frequency:  1 X week, once daily  Duration:  for 6 weeks  Discharge Plan:  Achieve all LTG.  Independent in home treatment program.  Reach maximal therapeutic benefit.    Please refer to the daily flowsheet for treatment today, total treatment time and time spent performing 1:1 timed codes.

## 2018-07-25 ENCOUNTER — OFFICE VISIT (OUTPATIENT)
Dept: ORTHOPEDICS | Facility: CLINIC | Age: 48
End: 2018-07-25
Payer: COMMERCIAL

## 2018-07-25 ENCOUNTER — RADIANT APPOINTMENT (OUTPATIENT)
Dept: GENERAL RADIOLOGY | Facility: CLINIC | Age: 48
End: 2018-07-25
Attending: FAMILY MEDICINE
Payer: COMMERCIAL

## 2018-07-25 VITALS — BODY MASS INDEX: 23.11 KG/M2 | WEIGHT: 156 LBS | HEIGHT: 69 IN

## 2018-07-25 DIAGNOSIS — M25.562 ACUTE PAIN OF LEFT KNEE: Primary | ICD-10-CM

## 2018-07-25 DIAGNOSIS — M25.562 ACUTE PAIN OF LEFT KNEE: ICD-10-CM

## 2018-07-25 RX ORDER — DICLOFENAC SODIUM 75 MG/1
75 TABLET, DELAYED RELEASE ORAL 2 TIMES DAILY
Qty: 28 TABLET | Refills: 1 | Status: SHIPPED | OUTPATIENT
Start: 2018-07-25 | End: 2023-03-07

## 2018-07-25 ASSESSMENT — ACTIVITIES OF DAILY LIVING (ADL)
HOW_WOULD_YOU_RATE_THE_OVERALL_FUNCTION_OF_YOUR_KNEE_DURING_YOUR_USUAL_DAILY_ACTIVITIES?: ABNORMAL
LIMPING: THE SYMPTOM AFFECTS MY ACTIVITY SLIGHTLY
HOW_WOULD_YOU_RATE_THE_CURRENT_FUNCTION_OF_YOUR_KNEE_DURING_YOUR_USUAL_DAILY_ACTIVITIES_ON_A_SCALE_FROM_0_TO_100_WITH_100_BEING_YOUR_LEVEL_OF_KNEE_FUNCTION_PRIOR_TO_YOUR_INJURY_AND_0_BEING_THE_INABILITY_TO_PERFORM_ANY_OF_YOUR_USUAL_DAILY_ACTIVITIES?: 40
GIVING WAY, BUCKLING OR SHIFTING OF KNEE: I DO NOT HAVE THE SYMPTOM
STIFFNESS: THE SYMPTOM AFFECTS MY ACTIVITY MODERATELY
GO DOWN STAIRS: ACTIVITY IS MINIMALLY DIFFICULT
RAW_SCORE: 48
SQUAT: ACTIVITY IS FAIRLY DIFFICULT
GO UP STAIRS: ACTIVITY IS MINIMALLY DIFFICULT
STAND: ACTIVITY IS NOT DIFFICULT
AS_A_RESULT_OF_YOUR_KNEE_INJURY,_HOW_WOULD_YOU_RATE_YOUR_CURRENT_LEVEL_OF_DAILY_ACTIVITY?: ABNORMAL
RISE FROM A CHAIR: ACTIVITY IS MINIMALLY DIFFICULT
KNEEL ON THE FRONT OF YOUR KNEE: ACTIVITY IS VERY DIFFICULT
KNEE_ACTIVITY_OF_DAILY_LIVING_SCORE: 68.57
KNEE_ACTIVITY_OF_DAILY_LIVING_SUM: 48
PAIN: THE SYMPTOM AFFECTS MY ACTIVITY MODERATELY
WALK: ACTIVITY IS MINIMALLY DIFFICULT
WEAKNESS: I HAVE THE SYMPTOM BUT IT DOES NOT AFFECT MY ACTIVITY
SIT WITH YOUR KNEE BENT: ACTIVITY IS NOT DIFFICULT
SWELLING: THE SYMPTOM AFFECTS MY ACTIVITY SLIGHTLY

## 2018-07-25 NOTE — PROGRESS NOTES
"[facility] Sports and Orthopedic Walk-in Clinic Note      Patient is a 48 year old male who presents to the office today for: left knee pain.  Previously seen by Dr. Elder for pain in the left knee and diagnosed the patellofemoral knee pain.  Has continued to have pain in the anterior knee that is worse with stairs.  Also has achy pain when sitting with bent knee for long period of time.  A few days ago he was kicked in the lateral left thigh while sparring during taekwondo.  Did have some soreness in that area but no bruising.  A few days later he noticed swelling and ecchymosis in the anterior knee.  It is diffusely tender in the region of the bruising.  His knee pain is otherwise unchanged.  No tingling numbness or weakness.  No clicking locking or catching.  No instability.  No pain at rest.  Ice and Tylenol have been beneficial.      Past Medical History, Current Medications, and Allergies are reviewed in the electronic medical record as appropriate.     ROS: Pertinent items are noted in HPI.    EXAM:Ht 5' 9\" (1.753 m)  Wt 156 lb (70.8 kg)  BMI 23.04 kg/m2    Patient is alert, No acute distress, pleasant and conversational.    Gait: nonantalgic. Normal heel toe gait.    left knee:   Skin intact. Ecchymosis over anterior knee  Trace effusion.     AROM: Zero to approximately 135  without restriction or reported pain.    Palpation:   Mild medial joint line tenderness, unchanged from previous per patient  TTP diffusely over anterior knee in region of ecchymosis  No medial or lateral facet joint tenderness.  No lateral joint line tenderness     Special Tests:  Negative bounce test, negative forced flexion and negative Jass's.  No ligamentous laxity or pain with valgus or varus stress.  Negative Lachman's, Anterior Drawer and Posterior Drawer     Full Isometric quad strength, extensor mechanism in place     Neurovascularly intact in the lower extremity    Hip and Ankle with full AROM and nontender      Imaging: " xrays of left knee performed and reviewed independently demonstrating no acute fx or dislocation. No significant degenerative change. Unchanged from previous.         Assessment: Patient is a 48 year old male with left knee and thigh pain. Suspect that ecchymosis and tenderness in anterior knee secondary to muscular injury in lateral thigh that has crept inferiorly with time. Do not suspect acute knee injury.     Recommendations:   Reviewed imaging and assessment with patient in detail.   Take diclofenac twice daily with food for two weeks  Do not take ibuprofen or naproxen while taking diclofenac  Ice or heat daily  Continue with physical therapy  Continue to use brace as needed    Avinash Siu MD

## 2018-07-25 NOTE — PATIENT INSTRUCTIONS
Take diclofenac twice daily with food for two weeks  Do not take ibuprofen or naproxen while taking diclofenac  Ice or heat daily  Continue with physical therapy  Continue to use brace as needed

## 2018-07-25 NOTE — MR AVS SNAPSHOT
After Visit Summary   7/25/2018    Padilla Damon    MRN: [id number]           Patient Information     Date Of Birth          1970        Visit Information        Provider Department      7/25/2018 1:20 PM Avinash Siu MD [facility] Orthopaedic Walk In [facility]        Today's Diagnoses     Acute pain of left knee    -  1      Care Instructions    Take diclofenac twice daily with food for two weeks  Do not take ibuprofen or naproxen while taking diclofenac  Ice or heat daily  Continue with physical therapy  Continue to use brace as needed          Follow-ups after your visit        Your next 10 appointments already scheduled     Jul 31, 2018  4:40 PM CDT   MIKAL Extremity with Tania Montiel [facility] ([facility]  )    [address]  [facility] [id number] [phone]            Aug 07, 2018  4:40 PM CDT   MIKAL Extremity with Tania Montiel [facility] ([facility]  )    [address] [place] [address] [id number]  [facility] [id number] [phone]              Who to contact     Please call your clinic at [phone] to:    Ask questions about your health    Make or cancel appointments    Discuss your medicines    Learn about your test results    Speak to your doctor            Additional Information About Your Visit        MyChart Information     Jack Robie is an electronic gateway that provides easy, online access to your medical records. With Jack Robie, you can request a clinic appointment, read your test results, renew a prescription or communicate with your care team.     To sign up for [email or web address] visit the website at [email or web address]   You will be asked to enter the access code listed below, as well as some personal information. Please follow the directions to create your username and password.     Your access code is: -[email or web address]  Expires: 9/6/2018   "6:30 AM     Your access code will  in 90 days. If you need help or a new code, please contact your [facility] or call [phone] for assistance.        Care EveryWhere ID     This is your Care EveryWhere ID. This could be used by other organizations to access your [facility] medical records  [id number]        Your Vitals Were     Height BMI (Body Mass Index)                5' 9\" (1.753 m) 23.04 kg/m2           Blood Pressure from Last 3 Encounters:   17 114/71    Weight from Last 3 Encounters:   18 156 lb (70.8 kg)   18 156 lb (70.8 kg)   17 155 lb (70.3 kg)                 Today's Medication Changes          These changes are accurate as of 18  3:35 PM.  If you have any questions, ask your nurse or doctor.               Start taking these medicines.        Dose/Directions    diclofenac 75 MG EC tablet   Commonly known as:  VOLTAREN   Used for:  Acute pain of left knee   Started by:  Avinash Siu MD        Dose:  75 mg   Take 1 tablet (75 mg) by mouth 2 times daily for 14 days   Quantity:  28 tablet   Refills:  1            Where to get your medicines      These medications were sent to [facility] [address] [place], [place] [id number]    Hours:  TRANSPLANT PHONE NUMBER [phone] Phone:  [phone]     diclofenac 75 MG EC tablet                Primary Care Provider Fax #    Physician No Ref-Primary [phone]       No address on file        Equal Access to Services     YUDELKA KEITH : Lashaun choudhuryo Sodavid, waaxda luqadaha, qaybta kaalmada adeegjanel, bart oates. So [facility] [phone].    ATENCIÓN: Si habla español, tiene a cruz disposición servicios gratuitos de asistencia lingüística. Llame al [phone].    We comply with applicable federal civil rights laws and [place] laws. We do not discriminate on the " basis of race, color, national origin, age, disability, sex, sexual orientation, or gender identity.            Thank you!     Thank you for choosing [facility] SPORTS AND ORTHOPAEDIC WALK IN CLINIC  for your care. Our goal is always to provide you with excellent care. Hearing back from our patients is one way we can continue to improve our services. Please take a few minutes to complete the written survey that you may receive in the mail after your visit with us. Thank you!             Your Updated Medication List - Protect others around you: Learn how to safely use, store and throw away your medicines at www.disposemymeds.org.          This list is accurate as of 7/25/18  3:35 PM.  Always use your most recent med list.                   Brand Name Dispense Instructions for use Diagnosis    diclofenac 75 MG EC tablet    VOLTAREN    28 tablet    Take 1 tablet (75 mg) by mouth 2 times daily for 14 days    Acute pain of left knee       ondansetron 4 MG ODT tab    ZOFRAN ODT    10 tablet    Take 1 tablet (4 mg) by mouth every 8 hours as needed for nausea

## 2018-07-25 NOTE — PROGRESS NOTES
SPORTS & ORTHOPEDIC WALK-IN VISIT 7/25/2018    Primary Care Physician:      Left knee pain since May. Pt saw Dr. Elder on 6/19/18 for knee pain and was diagnosed with patellofemoral knee pain.  [facility] his [facility]  He tried ice and NSAIDs with no relief. He stopped working out after having pain for about 2 weeks. Pain with stairs and knee flexion. XR was negative from 6/21 visit.  Pain is mostly anteromedial with some aches in the posterior knee. He has had one PT visit on 7/24/18.     He is here today due to re-injuring his knee a few days ago. He was kicked in the lateral thigh while sparring last Wednesday.  Then on Sunday he hit his knee on a child gate. Anterior knee pain after sitting for a few minuets and stiffness in the posterior knee. He has bruising and swelling in the anterior knee. He has progressive knee pain that has woken him up at night.     Reason for visit:     What part of your body is injured / painful?  left knee    What caused the injury /pain? Tae-myrnawn-do    How long ago did your injury occur or pain begin? May 2018    What are your most bothersome symptoms? Pain    How would you characterize your symptom?  aching and sharp    What makes your symptoms better? Rest, Ice and Tylenol    What makes your symptoms worse? Standing, Walking, Movement and Other: stairs     Have you been previously seen for this problem? Yes, Dr. Elder    Medical History:    Any recent changes to your medical history? No    Any new medication prescribed since last visit? No    Have you had surgery on this body part before? No    Review of Systems:    Do you have fever, chills, weight loss? No    Do you have any vision problems? No    Do you have any chest pain or edema? No    Do you have any shortness of breath or wheezing?  No    Do you have stomach problems? No    Do you have any numbness or focal weakness? No    Do you have diabetes? No    Do you have  problems with bleeding or clotting? No    Do you have an rashes or other skin lesions? No

## 2019-03-13 ENCOUNTER — ANCILLARY PROCEDURE (OUTPATIENT)
Dept: MRI IMAGING | Facility: CLINIC | Age: 49
End: 2019-03-13
Attending: FAMILY MEDICINE
Payer: COMMERCIAL

## 2019-03-13 ENCOUNTER — ANCILLARY PROCEDURE (OUTPATIENT)
Dept: GENERAL RADIOLOGY | Facility: CLINIC | Age: 49
End: 2019-03-13
Attending: FAMILY MEDICINE
Payer: COMMERCIAL

## 2019-03-13 ENCOUNTER — OFFICE VISIT (OUTPATIENT)
Dept: ORTHOPEDICS | Facility: CLINIC | Age: 49
End: 2019-03-13
Payer: COMMERCIAL

## 2019-03-13 VITALS — HEIGHT: 69 IN | BODY MASS INDEX: 23.04 KG/M2 | RESPIRATION RATE: 16 BRPM

## 2019-03-13 DIAGNOSIS — M25.562 ACUTE PAIN OF LEFT KNEE: Primary | ICD-10-CM

## 2019-03-13 DIAGNOSIS — M23.92 INTERNAL DERANGEMENT OF LEFT KNEE: ICD-10-CM

## 2019-03-13 DIAGNOSIS — M25.562 ACUTE PAIN OF LEFT KNEE: ICD-10-CM

## 2019-03-13 NOTE — LETTER
3/13/2019       RE: Padilla Damon  [address] [place] [address]     Dear Colleague,    Thank you for referring your patient, Padilla Damon, to the [facility] SPORTS AND ORTHOPAEDIC WALK IN CLINIC at [facility]. Please see a copy of my visit note below.          SPORTS & ORTHOPEDIC WALK-IN VISIT 3/13/2019    Primary Care Physician: Dr. Robert herrera. Was sparring a younger opponent, did a kick and landed on left knee. Believes it did a varus type of movement. Happened on 3/7. Has been wobbly since. Swollen. Has iced and elevated almost every night since. Taking naproxen as needed. Believes he may have torn a ligament. Reports popping/clicking with going upstairs. Difficulty fully extending, not necessarily d/t pain although it is painful.   History of left knee issues, moderate pain but resolved. Mild pain on and off since but otherwise hasn't had many problems with it since 7/25/19.     Reason for visit:     What part of your body is injured / painful?  left knee    What caused the injury /pain? Recreational/competitive sports injury - Other:taekwondo    How long ago did your injury occur or pain begin? a week ago    What are your most bothersome symptoms? Pain, Swelling, Clicking, popping and Giving way or instability    How would you characterize your symptom?  aching and sharp with certain weightbearing movements    What makes your symptoms better? Ice, Naproxen and Other: elevation    What makes your symptoms worse? Other: stairs, twisting    Have you been previously seen for this problem? No    Medical History:    Any recent changes to your medical history? No    Any new medication prescribed since last visit? No    Have you had surgery on this body part before? No    Social History:    Occupation:     Handedness:     Exercise: 3-7 days/week    Review of Systems:    Do you have fever, chills, weight loss? No    Do you have any vision problems? No    Do you have any  chest pain or edema? No    Do you have any shortness of breath or wheezing?  No    Do you have stomach problems? Yes, sick over the weekend    Do you have any numbness or focal weakness? No    Do you have diabetes? Monitoring A1C    Do you have problems with bleeding or clotting? No    Do you have an rashes or other skin lesions? No           [facility]  Orthopedics  Vasyl Brenner, DO  2019     Name: Padilla Damon  MRN: [id number]  Age: 48 year old  : 1970  Referring provider: Referred Self     Chief Complaint: Pain of the Left Knee     Date of Injury: 3/7/19     History of Present Illness:   Padilla Damon is a 48 year old male who presents today for evaluation of left knee pain. The patient notes that he has a history of left knee pain that has been evaluated and diagnosed as patellofemoral syndrome and treated with physical therapy. On Thursday (6 days ago), he was sparring in [facility] when he went for a kick with his right leg and came down on the left and felt his left knee shift tyler varus direction. Since that time, he has had diffuse pain, swelling, clicking, popping, and intermittent instability. He has also had mild trouble getting full extension. He has been icing and elevating with minimal relief so presents to the Walk-in-Clinic for evaluation.     Review of Systems:   A 10-point review of systems was obtained and is negative except for as noted in the HPI.     Medications:     Current Outpatient Medications:      diclofenac (VOLTAREN) 75 MG EC tablet, Take 1 tablet (75 mg) by mouth 2 times daily for 14 days, Disp: 28 tablet, Rfl: 1     ondansetron (ZOFRAN ODT) 4 MG ODT tab, Take 1 tablet (4 mg) by mouth every 8 hours as needed for nausea (Patient not taking: Reported on 2018), Disp: 10 tablet, Rfl: 0    Allergies:  No known drug allergies     Past Medical History:  Patellofemoral syndrome     Past Surgical History:  Noncontributory     Social History:  Presents alone   Exercise:  "teakwood and other sports   Tobacco use: Never smoker   Alcohol consumption: No   Marital status:      Family History:  Noncontributory     Physical Examination:  Resp. rate 16, height 1.753 m (5' 9\").    General  - normal appearance, in no obvious distress  CV  - normal popliteal pulse  Pulm  - normal respiratory pattern, non-labored  Musculoskeletal - Left knee  - stance: antalgic gait, no obvious leg length discrepancy  - inspection: moderate left knee effusion, normal bone and joint alignment, no obvious deformity  - palpation: medial joint line tenderness. patella and patellar tendon non-tender  - ROM: 135 degrees flexion, -5 degrees extension, not painful, normal actively and passively compared to contralateral  - strength: 5/5 in flexion, 5/5 in extension  - special tests:  (-) Lachman  (-) anterior drawer  (-) posterior drawer  (+) Jass  (+) Thessaly  (-) varus at 0 and 30 degrees flexion  (-) valgus at 0 and 30 degrees flexion  (-) Zev s compression test  Neuro  - no sensory or motor deficit, grossly normal coordination, normal muscle tone  Skin  - no ecchymosis, erythema, warmth, or induration, no obvious rash  Psych  - interactive, appropriate, normal mood and affect    Imaging:   Radiographs of the Knee Lt - 3 views (03/13/2019)  Final results and radiologist's interpretation, available in the [facility] health record. Images were reviewed with the patient/family members in the office today. My personal interpretation of the performed imaging is no acute osseous abnormalities or joint space narrowing.      Assessment:   48 year old male with acute on chronic left knee pain after landing and twisting his knee during a teakwood match 6 days ago. Concern is for worsening medial meniscal tear of left knee.     Plan:     Bracing as needed     Consider physical therapy if not improving.     MRI of the left knee.     Follow-up for results.     Scribe Disclosure:   I, Luis Enrique Cervantes, am serving as a " scribe to document services personally performed by Vasyl Brenner DO at this visit, based upon the provider's statements to me. All documentation has been reviewed by the aforementioned provider prior to being entered into the official medical record.     Again, thank you for allowing me to participate in the care of your patient.      Sincerely,    Vasyl Brenner DO

## 2019-03-13 NOTE — PROGRESS NOTES
"[facility]  Orthopedics  Vasyl Brenner, DO  2019     Name: Padilla Damon  MRN: [id number]  Age: 48 year old  : 1970  Referring provider: Referred Self     Chief Complaint: Pain of the Left Knee     Date of Injury: 3/7/19     History of Present Illness:   Padilla Damon is a 48 year old male who presents today for evaluation of left knee pain. The patient notes that he has a history of left knee pain that has been evaluated and diagnosed as patellofemoral syndrome and treated with physical therapy. On Thursday (6 days ago), he was sparring in [facility] when he went for a kick with his right leg and came down on the left and felt his left knee shift tyler varus direction. Since that time, he has had diffuse pain, swelling, clicking, popping, and intermittent instability. He has also had mild trouble getting full extension. He has been icing and elevating with minimal relief so presents to the Walk-in-Clinic for evaluation.     Review of Systems:   A 10-point review of systems was obtained and is negative except for as noted in the HPI.     Medications:     Current Outpatient Medications:      diclofenac (VOLTAREN) 75 MG EC tablet, Take 1 tablet (75 mg) by mouth 2 times daily for 14 days, Disp: 28 tablet, Rfl: 1     ondansetron (ZOFRAN ODT) 4 MG ODT tab, Take 1 tablet (4 mg) by mouth every 8 hours as needed for nausea (Patient not taking: Reported on 2018), Disp: 10 tablet, Rfl: 0    Allergies:  No known drug allergies     Past Medical History:  Patellofemoral syndrome     Past Surgical History:  Noncontributory     Social History:  Presents alone   Exercise: teakwood and other sports   Tobacco use: Never smoker   Alcohol consumption: No   Marital status:      Family History:  Noncontributory     Physical Examination:  Resp. rate 16, height 1.753 m (5' 9\").    General  - normal appearance, in no obvious distress  CV  - normal popliteal pulse  Pulm  - normal respiratory pattern, " non-labored  Musculoskeletal - Left knee  - stance: antalgic gait, no obvious leg length discrepancy  - inspection: moderate left knee effusion, normal bone and joint alignment, no obvious deformity  - palpation: medial joint line tenderness. patella and patellar tendon non-tender  - ROM: 135 degrees flexion, -5 degrees extension, not painful, normal actively and passively compared to contralateral  - strength: 5/5 in flexion, 5/5 in extension  - special tests:  (-) Lachman  (-) anterior drawer  (-) posterior drawer  (+) Jass  (+) Thessaly  (-) varus at 0 and 30 degrees flexion  (-) valgus at 0 and 30 degrees flexion  (-) Zev s compression test  Neuro  - no sensory or motor deficit, grossly normal coordination, normal muscle tone  Skin  - no ecchymosis, erythema, warmth, or induration, no obvious rash  Psych  - interactive, appropriate, normal mood and affect    Imaging:   Radiographs of the Knee Lt - 3 views (03/13/2019)  Final results and radiologist's interpretation, available in the [facility] health record. Images were reviewed with the patient/family members in the office today. My personal interpretation of the performed imaging is no acute osseous abnormalities or joint space narrowing.      Assessment:   48 year old male with acute on chronic left knee pain after landing and twisting his knee during a [employer] match 6 days ago. Concern is for worsening medial meniscal tear of left knee.     Plan:     Bracing as needed     Consider physical therapy if not improving.     MRI of the left knee.     Follow-up for results.     Scribe Disclosure:   I, Luis Enrique Cervantes, am serving as a scribe to document services personally performed by Vasyl Brenner DO at this visit, based upon the provider's statements to me. All documentation has been reviewed by the aforementioned provider prior to being entered into the official medical record.

## 2019-03-13 NOTE — PROGRESS NOTES
SPORTS & ORTHOPEDIC WALK-IN VISIT 3/13/2019    Primary Care Physician: Dr. Robert herrera. Was sparring a younger opponent, did a kick and landed on left knee. Believes it did a varus type of movement. Happened on 3/7. Has been wobbly since. Swollen. Has iced and elevated almost every night since. Taking naproxen as needed. Believes he may have torn a ligament. Reports popping/clicking with going upstairs. Difficulty fully extending, not necessarily d/t pain although it is painful.   History of left knee issues, moderate pain but resolved. Mild pain on and off since but otherwise hasn't had many problems with it since 7/25/19.     Reason for visit:     What part of your body is injured / painful?  left knee    What caused the injury /pain? Recreational/competitive sports injury - Other:sharon    How long ago did your injury occur or pain begin? a week ago    What are your most bothersome symptoms? Pain, Swelling, Clicking, popping and Giving way or instability    How would you characterize your symptom?  aching and sharp with certain weightbearing movements    What makes your symptoms better? Ice, Naproxen and Other: elevation    What makes your symptoms worse? Other: stairs, twisting    Have you been previously seen for this problem? No    Medical History:    Any recent changes to your medical history? No    Any new medication prescribed since last visit? No    Have you had surgery on this body part before? No    Social History:    Occupation:     Handedness:     Exercise: 3-7 days/week    Review of Systems:    Do you have fever, chills, weight loss? No    Do you have any vision problems? No    Do you have any chest pain or edema? No    Do you have any shortness of breath or wheezing?  No    Do you have stomach problems? Yes, sick over the weekend    Do you have any numbness or focal weakness? No    Do you have diabetes? Monitoring A1C    Do you have problems with bleeding or clotting? No    Do you  have an rashes or other skin lesions? No

## 2019-03-13 NOTE — LETTER
Date:March 15, 2019      Patient was self referred, no letter generated. Do not send.        [facility] Health Information

## 2019-03-14 ENCOUNTER — DOCUMENTATION ONLY (OUTPATIENT)
Dept: CARE COORDINATION | Facility: CLINIC | Age: 49
End: 2019-03-14

## 2019-03-15 ENCOUNTER — TELEPHONE (OUTPATIENT)
Dept: ORTHOPEDICS | Facility: CLINIC | Age: 49
End: 2019-03-15

## 2019-03-15 NOTE — TELEPHONE ENCOUNTER
Attempted to reach patient via telephone.  A voicemail was left notifying Padilla  that I have results of advanced imaging- MRI and that they may return my call by dialing [phone] to discuss results in detail as well as updating therapeutic plan.  Will try again this afternoon as well.     Vasyl Brenner, DO CAQSM  Primary Care Sports Medicine

## 2019-03-16 ENCOUNTER — TELEPHONE (OUTPATIENT)
Dept: ORTHOPEDICS | Facility: CLINIC | Age: 49
End: 2019-03-16

## 2019-03-19 ENCOUNTER — DOCUMENTATION ONLY (OUTPATIENT)
Dept: ORTHOPEDICS | Facility: CLINIC | Age: 49
End: 2019-03-19

## 2019-03-19 NOTE — PROGRESS NOTES
Attempted to call Padilla about his appointment today with Dr. Brenner. He has not called back to schedule with Dr. Krause so I was attempting to reach him again as the plan is for him to see Dr. Krause tomorrow and not see Dr. Brenner today.

## 2019-03-25 NOTE — TELEPHONE ENCOUNTER
RECORDS RECEIVED FROM: Left knee ACL and meniscus, referred by walk-in, records internal   DATE RECEIVED: 03/25/19    NOTES STATUS DETAILS   OFFICE NOTE from referring provider Internal Dr. Brenner 3/13/19   OFFICE NOTE from other specialist N/A    DISCHARGE SUMMARY from hospital N/A    DISCHARGE REPORT from the ER N/A    OPERATIVE REPORT N/A    MEDICATION LIST Internal    IMPLANT RECORD/STICKER N/A    LABS     CBC/DIFF N/A    CULTURES N/A    INJECTIONS DONE IN RADIOLOGY N/A    MRI Internal 3/13/19   CT SCAN N/A    XRAYS (IMAGES & REPORTS) Internal 3/13/19   TUMOR     PATHOLOGY  Slides & report N/A

## 2019-04-01 ENCOUNTER — OFFICE VISIT (OUTPATIENT)
Dept: ORTHOPEDICS | Facility: CLINIC | Age: 49
End: 2019-04-01
Payer: COMMERCIAL

## 2019-04-01 ENCOUNTER — PRE VISIT (OUTPATIENT)
Dept: ORTHOPEDICS | Facility: CLINIC | Age: 49
End: 2019-04-01

## 2019-04-01 VITALS — HEIGHT: 69 IN | WEIGHT: 156 LBS | BODY MASS INDEX: 23.11 KG/M2

## 2019-04-01 DIAGNOSIS — M23.52 OLD DISRUPTION OF ANTERIOR CRUCIATE LIGAMENT OF LEFT KNEE: Primary | ICD-10-CM

## 2019-04-01 ASSESSMENT — MIFFLIN-ST. JEOR: SCORE: 1557.99

## 2019-04-01 NOTE — NURSING NOTE
Teaching Flowsheet   Relevant Diagnosis: Left ACL tear, meniscus tear.  Teaching Topic: Left knee EUA, ACL reconstruction hamstring autograft, meniscus surgery.    Patient states he lives with his wife and children. He is looking at scheduling in the middle of May after a work trip he needs to take. Health history negative per patient. Patient will call to schedule.      Person(s) involved in teaching:   Patient     Motivation Level:  Asks Questions: Yes  Eager to Learn: Yes  Cooperative: Yes  Receptive (willing/able to accept information): Yes  Any cultural factors/Islam beliefs that may influence understanding or compliance? No     Patient demonstrates understanding of the following:  Reason for the appointment, diagnosis and treatment plan: Yes  Knowledge of proper use of medications and conditions for which they are ordered (with special attention to potential side effects or drug interactions): Yes  Which situations necessitate calling provider and whom to contact: Yes     Teaching Concerns Addressed: Patient understands he will need a preoperative H&P within 30 days of the date of surgery. He understands he will begin physical therapy 3-5 days postop (wishes to do this at [facility]).      Proper use and care of brace, crutches. (medical equip, care aids, etc.): Yes  Nutritional needs and diet plan: NA  Pain management techniques: Yes  Wound Care: Yes  How and/when to access community resources: Yes     Instructional Materials Used/Given: Preoperative teaching packet, surgical soap.

## 2019-04-01 NOTE — NURSING NOTE
"Reason For Visit:   Chief Complaint   Patient presents with     Left Knee - Consult     ACL injury DOI 3/7/19       Ht 1.753 m (5' 9\")   Wt 70.8 kg (156 lb)   BMI 23.04 kg/m      Pain Assessment  Patient Currently in Pain: Yes  0-10 Pain Scale: 4  Primary Pain Location: Knee  Pain Descriptors: Dull(Click)    Hilario Dyer ATC  "

## 2019-04-01 NOTE — PROGRESS NOTES
CHIEF CONCERN: Left knee ACL tear, medial meniscus tear    HISTORY:   Very pleasant 48-year-old male.  He is very involved doing [employer] sustained an injury to his knee approximately 3 weeks ago felt 2 pops in his knee.  Immediate onset of knee pain.  And from doing what he wants to do.  Discussed his case with 1 of our primary care physicians who ordered an MRI.  He follows up to discuss with me after it showed a medial meniscus tear and a rupture of his anterior cruciate ligament    PAST MEDICAL HISTORY: (Reviewed with the patient and in the EP[facility] medical record)  1. None    PAST SURGICAL HISTORY: (Reviewed with the patient and in the EPIC medical record)  1. None    MEDICATIONS: (Reviewed with the patient and in the EP[facility] medical record)    Notable medications include: None    ALLERGIES: (Reviewed with the patient and in the EPIC medical record)  1. None      SOCIAL HISTORY: (Reviewed with the patient and in the medical record)  --Tobacco: No smoking  --Occupation: He works as a  of hour car  --Avocation/Sport: He enjoys [employer]    FAMILY HISTORY: (Reviewed with the patient and in the medical record)  -- No family history of bleeding, clotting, or difficulty with anesthesia      REVIEW OF SYSTEMS: (Reviewed with the patient and on the health intake form)  -- A comprehensive 10 point review of systems was conducted and is negative except as noted in the HPI    EXAM:     General: Awake, Alert and Oriented, No acute Distress. Articulate and Interactive    Body mass index is 23.04 kg/m .    Left lower extremity :    Skin is Warm and Well perfused, no suggestion of infection    No incisions    1+ effusion    Positive medial joint line tenderness, less so laterally    Range of motion is 0-135 degrees    Stable to varus and valgus stress testing stable posterior drawer testing, 1+ pivot shift.  2B Lachman    EHL/FHL/TA/GS 5/5    Sensation intact L3-S1    2+ Dorsalis Pedis Pulse     IMAGING:    Plain Radiographs:  Plain films show no fractures dislocations well-maintained joint space    MRI: MRI demonstrates a rupture of his anterior cruciate ligament medial meniscus tear    ASSESSMENT:  1. ACL tear  2. Medial meniscus tear    PLAN:  1. I long discussion with the patient regarding his ACL tear.  At this time it is my recreation that he consider an ACL reconstruction.  I discussed with him graft choice including hamstring autograft which the patient seemed agreeable to.  2. He would like to talk it over with his wife as well as his gucci  I have told him specifically that it is not necessary want to choose no surgery however it is wrong to allow his knee to continue to sublux with sporting activities and if this is the case he should not continue to allow this to happen as it may wear out prematurely.    I discussed with the patient the risks, benefits, complications and techniques of surgery as well as the natural history of ACL tears, meniscus tears and the alternative treatment options.    The risks include, but are not limited to the risk of death and risk of a myocardial infarction, risk of bleeding and a risk of infection, risk of nerve damage and a risk of muscle damage, stiffness, instability, continued pain or worsening pain and re-tear of his ACL, stiffness requiring manipulation under anesthesia, need for future surgery, failure to improve.    The patient was provided an opportunity to ask questions and these were answered.

## 2019-04-01 NOTE — LETTER
4/1/2019       RE: Padilla Damon  [address] [place] [address]     Dear Colleague,    Thank you for referring your patient, Padilla Damon, to the [facility]. Please see a copy of my visit note below.    CHIEF CONCERN: Left knee ACL tear, medial meniscus tear    HISTORY:   Very pleasant 48-year-old male.  He is very involved doing [employer] sustained an injury to his knee approximately 3 weeks ago felt 2 pops in his knee.  Immediate onset of knee pain.  And from doing what he wants to do.  Discussed his case with 1 of our primary care physicians who ordered an MRI.  He follows up to discuss with me after it showed a medial meniscus tear and a rupture of his anterior cruciate ligament    PAST MEDICAL HISTORY: (Reviewed with the patient and in the [facility] medical record)  1. None    PAST SURGICAL HISTORY: (Reviewed with the patient and in the [facility] medical record)  1. None    MEDICATIONS: (Reviewed with the patient and in the [facility] medical record)    Notable medications include: None    ALLERGIES: (Reviewed with the patient and in the [facility] medical record)  1. None      SOCIAL HISTORY: (Reviewed with the patient and in the medical record)  --Tobacco: No smoking  --Occupation: He works as a  of hour car  --Avocation/Sport: He enjoys [employer]    FAMILY HISTORY: (Reviewed with the patient and in the medical record)  -- No family history of bleeding, clotting, or difficulty with anesthesia      REVIEW OF SYSTEMS: (Reviewed with the patient and on the health intake form)  -- A comprehensive 10 point review of systems was conducted and is negative except as noted in the HPI    EXAM:     General: Awake, Alert and Oriented, No acute Distress. Articulate and Interactive    Body mass index is 23.04 kg/m .    Left lower extremity :    Skin is Warm and Well perfused, no suggestion of infection    No incisions    1+ effusion    Positive medial joint line  tenderness, less so laterally    Range of motion is 0-135 degrees    Stable to varus and valgus stress testing stable posterior drawer testing, 1+ pivot shift.  2B Lachman    EHL/FHL/TA/GS 5/5    Sensation intact L3-S1    2+ Dorsalis Pedis Pulse     IMAGING:    Plain Radiographs: Plain films show no fractures dislocations well-maintained joint space    MRI: MRI demonstrates a rupture of his anterior cruciate ligament medial meniscus tear    ASSESSMENT:  1. ACL tear  2. Medial meniscus tear    PLAN:  1. I long discussion with the patient regarding his ACL tear.  At this time it is my recreation that he consider an ACL reconstruction.  I discussed with him graft choice including hamstring autograft which the patient seemed agreeable to.  2. He would like to talk it over with his wife as well as his sharon  I have told him specifically that it is not necessary want to choose no surgery however it is wrong to allow his knee to continue to sublux with sporting activities and if this is the case he should not continue to allow this to happen as it may wear out prematurely.    I discussed with the patient the risks, benefits, complications and techniques of surgery as well as the natural history of ACL tears, meniscus tears and the alternative treatment options.    The risks include, but are not limited to the risk of death and risk of a myocardial infarction, risk of bleeding and a risk of infection, risk of nerve damage and a risk of muscle damage, stiffness, instability, continued pain or worsening pain and re-tear of his ACL, stiffness requiring manipulation under anesthesia, need for future surgery, failure to improve.    The patient was provided an opportunity to ask questions and these were answered.           Again, thank you for allowing me to participate in the care of your patient.      Sincerely,    Aureliano Krause MD

## 2019-04-23 ENCOUNTER — TELEPHONE (OUTPATIENT)
Dept: ORTHOPEDICS | Facility: CLINIC | Age: 49
End: 2019-04-23

## 2019-04-23 ENCOUNTER — HOSPITAL ENCOUNTER (OUTPATIENT)
Facility: AMBULATORY SURGERY CENTER | Age: 49
End: 2019-04-23
Attending: ORTHOPAEDIC SURGERY
Payer: COMMERCIAL

## 2019-04-23 NOTE — TELEPHONE ENCOUNTER
[facility] Call Center    Phone Message    May a detailed message be left on voicemail: yes    Reason for Call: Other: Pt has not heard back from surgery scheduling. Would like to speak to Bessie.     Action Taken: Message routed to:  [facility] ([facility] Ortho

## 2019-04-23 NOTE — TELEPHONE ENCOUNTER
Patient is scheduled for surgery with Dr. Krause    Spoke or left message with: Patient    Date of Surgery: 5/31/19    Location: [facility]    Post op: 1 week, scheduled    Pre-op with surgeon (if applicable): Complete    H&P: Patient instructed to schedule with PCP    Additional imaging/appointments: N/A    Surgery packet: Mailed to patient's home 4/23/19    Additional comments: N/A

## 2019-05-07 NOTE — TELEPHONE ENCOUNTER
Returned call to patient to discuss preop restrictions. Informed patient that the maximum would be locked in extension x1 week, after 1 week may unlock for sitting and PT but keep locked when up and ambulating; toe-touch weightbearing on crutches for 6 weeks. Informed patient that this could be different, possibly less, depending on what he finds during surgery and the procedure he performs (whether meniscus is included). Patient expressed understanding. He may call back to reschedule as his wife will be out of town after surgery.

## 2019-05-24 RX ORDER — CEFAZOLIN SODIUM 2 G/50ML
2 SOLUTION INTRAVENOUS
Status: CANCELLED | OUTPATIENT
Start: 2019-05-31

## 2019-05-24 RX ORDER — CEFAZOLIN SODIUM 1 G/50ML
1 SOLUTION INTRAVENOUS SEE ADMIN INSTRUCTIONS
Status: CANCELLED | OUTPATIENT
Start: 2019-05-31

## 2019-05-29 ENCOUNTER — TELEPHONE (OUTPATIENT)
Dept: ORTHOPEDICS | Facility: CLINIC | Age: 49
End: 2019-05-29

## 2019-05-29 NOTE — TELEPHONE ENCOUNTER
Received call from patient to cancel his surgery with Dr. Krause 5/31/19. Patient states they have had an illness in the family and determined this is not the right time for him to have surgery. Patient will call back in the future when he is ready to reschedule.

## 2019-12-16 ENCOUNTER — HOSPITAL ENCOUNTER (EMERGENCY)
Facility: CLINIC | Age: 49
Discharge: HOME | End: 2019-12-17
Attending: EMERGENCY MEDICINE | Admitting: EMERGENCY MEDICINE
Payer: COMMERCIAL

## 2019-12-16 VITALS
HEART RATE: 68 BPM | DIASTOLIC BLOOD PRESSURE: 76 MMHG | SYSTOLIC BLOOD PRESSURE: 114 MMHG | WEIGHT: 148 LBS | TEMPERATURE: 97.8 F | RESPIRATION RATE: 20 BRPM | HEIGHT: 68 IN | OXYGEN SATURATION: 99 % | BODY MASS INDEX: 22.43 KG/M2

## 2019-12-16 DIAGNOSIS — R07.9 CHEST PAIN, UNSPECIFIED TYPE: ICD-10-CM

## 2019-12-16 LAB
ANION GAP SERPL CALCULATED.3IONS-SCNC: <1 MMOL/L (ref 3–14)
BASOPHILS # BLD AUTO: 0 10E9/L (ref 0–0.2)
BASOPHILS NFR BLD AUTO: 0.2 %
BUN SERPL-MCNC: 20 MG/DL (ref 7–30)
CALCIUM SERPL-MCNC: 9 MG/DL (ref 8.5–10.1)
CHLORIDE SERPL-SCNC: 103 MMOL/L (ref 94–109)
CO2 SERPL-SCNC: 33 MMOL/L (ref 20–32)
CREAT SERPL-MCNC: 0.98 MG/DL (ref 0.66–1.25)
DIFFERENTIAL METHOD BLD: NORMAL
EOSINOPHIL # BLD AUTO: 0.1 10E9/L (ref 0–0.7)
EOSINOPHIL NFR BLD AUTO: 1.4 %
ERYTHROCYTE [DISTWIDTH] IN BLOOD BY AUTOMATED COUNT: 11.8 % (ref 10–15)
GFR SERPL CREATININE-BSD FRML MDRD: 89 ML/MIN/{1.73_M2}
GLUCOSE SERPL-MCNC: 91 MG/DL (ref 70–99)
HCT VFR BLD AUTO: 42.3 % (ref 40–53)
HGB BLD-MCNC: 14.8 G/DL (ref 13.3–17.7)
IMM GRANULOCYTES # BLD: 0 10E9/L (ref 0–0.4)
IMM GRANULOCYTES NFR BLD: 0 %
LYMPHOCYTES # BLD AUTO: 1.5 10E9/L (ref 0.8–5.3)
LYMPHOCYTES NFR BLD AUTO: 29.3 %
MCH RBC QN AUTO: 30.8 PG (ref 26.5–33)
MCHC RBC AUTO-ENTMCNC: 35 G/DL (ref 31.5–36.5)
MCV RBC AUTO: 88 FL (ref 78–100)
MONOCYTES # BLD AUTO: 0.6 10E9/L (ref 0–1.3)
MONOCYTES NFR BLD AUTO: 12.5 %
NEUTROPHILS # BLD AUTO: 2.8 10E9/L (ref 1.6–8.3)
NEUTROPHILS NFR BLD AUTO: 56.6 %
NRBC # BLD AUTO: 0 10*3/UL
NRBC BLD AUTO-RTO: 0 /100
PLATELET # BLD AUTO: 225 10E9/L (ref 150–450)
POTASSIUM SERPL-SCNC: 3.7 MMOL/L (ref 3.4–5.3)
RBC # BLD AUTO: 4.8 10E12/L (ref 4.4–5.9)
SODIUM SERPL-SCNC: 136 MMOL/L (ref 133–144)
TROPONIN I SERPL-MCNC: <0.015 UG/L (ref 0–0.04)
WBC # BLD AUTO: 5 10E9/L (ref 4–11)

## 2019-12-16 PROCEDURE — 84484 ASSAY OF TROPONIN QUANT: CPT | Performed by: EMERGENCY MEDICINE

## 2019-12-16 PROCEDURE — 80048 BASIC METABOLIC PNL TOTAL CA: CPT | Performed by: EMERGENCY MEDICINE

## 2019-12-16 PROCEDURE — 85025 COMPLETE CBC W/AUTO DIFF WBC: CPT | Performed by: EMERGENCY MEDICINE

## 2019-12-16 PROCEDURE — 99284 EMERGENCY DEPT VISIT MOD MDM: CPT | Mod: 25

## 2019-12-16 ASSESSMENT — MIFFLIN-ST. JEOR: SCORE: 1500.82

## 2019-12-16 NOTE — ED AVS SNAPSHOT
Emergency Department  [address] [place] [address]  Phone:  [phone]  Fax:  [phone]                                    Padilla Damon   MRN: [id number]    Department:   Emergency Department   Date of Visit:  12/16/2019           After Visit Summary Signature Page    I have received my discharge instructions, and my questions have been answered. I have discussed any challenges I see with this plan with the nurse or doctor.    ..........................................................................................................................................  Patient/Patient Representative Signature      ..........................................................................................................................................  Patient Representative Print Name and Relationship to Patient    ..................................................               ................................................  Date                                   Time    ..........................................................................................................................................  Reviewed by Signature/Title    ...................................................              ..............................................  Date                                               Time          22EPIC Rev 08/18

## 2019-12-17 LAB
INTERPRETATION ECG - MUSE: NORMAL
TROPONIN I SERPL-MCNC: <0.015 UG/L (ref 0–0.04)

## 2019-12-17 PROCEDURE — 84484 ASSAY OF TROPONIN QUANT: CPT | Performed by: EMERGENCY MEDICINE

## 2019-12-17 ASSESSMENT — ENCOUNTER SYMPTOMS
COUGH: 0
WHEEZING: 0
PALPITATIONS: 1
SHORTNESS OF BREATH: 1

## 2019-12-17 NOTE — ED TRIAGE NOTES
Patient here with chest pain after working out on his bike around 9 pm tonight. He is also having shortness of breath

## 2019-12-17 NOTE — ED PROVIDER NOTES
"History     Chief Complaint:  Chest Pain    HPI  Padilla Damon is a 49 year old male who presents to the emergency department today for evaluation of chest pain. He was working out tonight on his bike at home and after he finished this felt some sternal chest pain, shortness of breath, and \"a feeling of swallowing\" in his chest. This pain was at its worst at 2130. He also notes his heart rate was 85 while resting after exercise when it usually returns quickly to 60. He denies any recent travel. He does have a family history of CHF but no personal cardiac history.       Allergies:  No known drug allergies    Medications:    Voltaren  Zofran    Past Medical History:    Patellofemoral syndrome of left knee   Segmental and somatic dysfunction of pelvic region    Past Surgical History:    History reviewed. No pertinent surgical history.    Family History:    History reviewed. No pertinent family history.     Social History:  The patient reports that he has never smoked. He has never used smokeless tobacco. He reports that he does not drink alcohol or use drugs.   Marital Status:  [2]      Review of Systems   Respiratory: Positive for shortness of breath. Negative for cough and wheezing.    Cardiovascular: Positive for chest pain and palpitations. Negative for leg swelling.     10 point review of systems performed and is negative except as above and in HPI.    Physical Exam     Patient Vitals for the past 24 hrs:   BP Temp Temp src Pulse Resp SpO2 Height Weight   12/16/19 2232 114/76 97.8  F (36.6  C) Oral 68 20 99 % 1.727 m (5' 8\") 67.1 kg (148 lb)     Physical Exam  General: Resting on the gurney, appears  uncomfortable  Head:  The scalp, face, and head appear normal  Mouth/Throat: Mucus membranes are moist  CV:  Regular rate    Normal S1 and S2  No pathological murmur   Resp:  Breath sounds clear and equal bilaterally    Non-labored, no retractions or accessory muscle use    No coarseness    No wheezing "   GI:  Abdomen is soft, no rigidity    No tenderness to palpation  MS:  Normal motor assessment of all extremities.    Good capillary refill noted.      Skin:   No rash or lesions noted.  Neuro:   Speech is normal and fluent. No apparent deficit.  Psych: Awake. Alert.  Normal affect.      Appropriate interactions.    Emergency Department Course     ECG:  ECG taken at 2229, ECG read at 2234  Normal sinus rhythm  Minimal voltage criteria for LVH, may be normal varient  Borderline ECG  Rate 65 bpm. OR interval 140 ms. QRS duration 88 ms. QT/QTc 382/397 ms. P-R-T axes 82 75 67     Laboratory:  Laboratory findings were communicated with the patient who voiced understanding of the findings.    CBC:  o/w WNL. (WBC 5.0, HGB 14.8, )   BMP: Glucose 91, carbon dioxide 33 (H), anion gap <1 (L), o/w WNL (Creatinine: 0.98)    Troponin (Collected 0048): <0.015  Troponin (Collected 2239): <0.015    Emergency Department Course:  Past medical records, nursing notes, and vitals reviewed.  0026: I performed an exam of the patient and obtained history, as documented above.     IV was inserted and blood was drawn for laboratory testing, results above.    I personally reviewed the laboratory/imaging results with the Patient and answered all related questions prior to discharge.    0121: I rechecked the patient.  Findings and plan explained to the Patient. Patient discharged home with instructions regarding supportive care, medications, and reasons to return. The importance of close follow-up was reviewed.     Impression & Plan        Medical Decision Making:  Padilla Damon presents with chest pain.  The work up in the Emergency Department is negative.  The differential diagnosis of chest pain is broad and includes life threatening etiologies such as Acute coronary syndrome, Myocardial infarction, Pulmonary Embolism, and Acute Aortic Dissection.  Other causes may include pneumonia, pneumothorax, pericarditis, pleurisy, and  esophageal spasm.  No serious etiology for the chest pain was detected today during this visit.  He refused XRay and was hesitant to have much of an evaluation at all.  He reports he will follow up for stress echo and holter.     Close follow up with primary care is indicated; this was made clear to the patient, who understands. He will follow up with stress test and Holter monitor.     Diagnosis:    ICD-10-CM    1. Chest pain, unspecified type R07.9 Echo Stress Echocardiogram     Holter Monitor 48 hour Adult Pediatric       Disposition:   discharged to home      Scribe Disclosure:  Veronica OCHOA am serving as a scribe at 12:26 AM on 12/17/2019 to document services personally performed by Madison Guo MD based on my observations and the provider's statements to me.     EMERGENCY DEPARTMENT       Madison Guo MD  12/17/19 0633

## 2020-01-03 ENCOUNTER — TELEPHONE (OUTPATIENT)
Dept: ORTHOPEDICS | Facility: CLINIC | Age: 50
End: 2020-01-03

## 2020-01-03 NOTE — TELEPHONE ENCOUNTER
[facility] Call Center    Phone Message    May a detailed message be left on voicemail: yes    Reason for Call: Other: Patient is requesting a call back to schedule surgery.  He was seen for a consult on 4-1-19 but hasnt been seen since.  He is unsure if he needs to be seen again or if  he can just schedule surgery.  Writer was advised to send encounter to clinic for determination.      Action Taken: Message routed to:  [facility] Ortho

## 2020-01-07 NOTE — TELEPHONE ENCOUNTER
Returned call to patient and confirmed that he will need to see Dr Krause in clinic prior to having surgery. Patient was told he can call surgery scheduling to set up both his clinic appointment and surgery date if he wishes. Patient was left the direct phone number for scheduling. [phone].

## 2020-01-16 NOTE — TELEPHONE ENCOUNTER
Agree that it would be best to see him back in clinic to continue a discussion in person.  Can we reach out to him and try to get him an appointment please.

## 2020-03-11 ENCOUNTER — HEALTH MAINTENANCE LETTER (OUTPATIENT)
Age: 50
End: 2020-03-11

## 2020-12-09 ENCOUNTER — HOSPITAL ENCOUNTER (EMERGENCY)
Facility: CLINIC | Age: 50
Discharge: HOME | End: 2020-12-09
Attending: NURSE PRACTITIONER | Admitting: NURSE PRACTITIONER
Payer: COMMERCIAL

## 2020-12-09 ENCOUNTER — TRANSFERRED RECORDS (OUTPATIENT)
Dept: HEALTH INFORMATION MANAGEMENT | Facility: CLINIC | Age: 50
End: 2020-12-09

## 2020-12-09 ENCOUNTER — APPOINTMENT (OUTPATIENT)
Dept: GENERAL RADIOLOGY | Facility: CLINIC | Age: 50
End: 2020-12-09
Attending: NURSE PRACTITIONER
Payer: COMMERCIAL

## 2020-12-09 VITALS
SYSTOLIC BLOOD PRESSURE: 114 MMHG | OXYGEN SATURATION: 99 % | WEIGHT: 145 LBS | BODY MASS INDEX: 22.05 KG/M2 | HEART RATE: 51 BPM | RESPIRATION RATE: 10 BRPM | DIASTOLIC BLOOD PRESSURE: 74 MMHG | TEMPERATURE: 98 F

## 2020-12-09 DIAGNOSIS — R07.9 CHEST PAIN: ICD-10-CM

## 2020-12-09 LAB
ALBUMIN SERPL-MCNC: 4 G/DL (ref 3.4–5)
ALP SERPL-CCNC: 66 U/L (ref 40–150)
ALT SERPL W P-5'-P-CCNC: 50 U/L (ref 0–70)
ANION GAP SERPL CALCULATED.3IONS-SCNC: 5 MMOL/L (ref 3–14)
AST SERPL W P-5'-P-CCNC: 24 U/L (ref 0–45)
BASOPHILS # BLD AUTO: 0 10E9/L (ref 0–0.2)
BASOPHILS NFR BLD AUTO: 0.8 %
BILIRUB SERPL-MCNC: 0.3 MG/DL (ref 0.2–1.3)
BUN SERPL-MCNC: 16 MG/DL (ref 7–30)
CALCIUM SERPL-MCNC: 8.8 MG/DL (ref 8.5–10.1)
CHLORIDE SERPL-SCNC: 107 MMOL/L (ref 94–109)
CO2 SERPL-SCNC: 30 MMOL/L (ref 20–32)
CREAT SERPL-MCNC: 0.86 MG/DL (ref 0.66–1.25)
DIFFERENTIAL METHOD BLD: ABNORMAL
EOSINOPHIL # BLD AUTO: 0 10E9/L (ref 0–0.7)
EOSINOPHIL NFR BLD AUTO: 0.5 %
ERYTHROCYTE [DISTWIDTH] IN BLOOD BY AUTOMATED COUNT: 11.5 % (ref 10–15)
GFR SERPL CREATININE-BSD FRML MDRD: >90 ML/MIN/{1.73_M2}
GLUCOSE SERPL-MCNC: 89 MG/DL (ref 70–99)
HCT VFR BLD AUTO: 43.5 % (ref 40–53)
HGB BLD-MCNC: 14.8 G/DL (ref 13.3–17.7)
IMM GRANULOCYTES # BLD: 0 10E9/L (ref 0–0.4)
IMM GRANULOCYTES NFR BLD: 0.3 %
INTERPRETATION ECG - MUSE: NORMAL
LYMPHOCYTES # BLD AUTO: 1 10E9/L (ref 0.8–5.3)
LYMPHOCYTES NFR BLD AUTO: 26.3 %
MCH RBC QN AUTO: 30.3 PG (ref 26.5–33)
MCHC RBC AUTO-ENTMCNC: 34 G/DL (ref 31.5–36.5)
MCV RBC AUTO: 89 FL (ref 78–100)
MONOCYTES # BLD AUTO: 0.3 10E9/L (ref 0–1.3)
MONOCYTES NFR BLD AUTO: 8.5 %
NEUTROPHILS # BLD AUTO: 2.5 10E9/L (ref 1.6–8.3)
NEUTROPHILS NFR BLD AUTO: 63.6 %
NRBC # BLD AUTO: 0 10*3/UL
NRBC BLD AUTO-RTO: 0 /100
PLATELET # BLD AUTO: 227 10E9/L (ref 150–450)
POTASSIUM SERPL-SCNC: 3.9 MMOL/L (ref 3.4–5.3)
PROT SERPL-MCNC: 7.3 G/DL (ref 6.8–8.8)
RBC # BLD AUTO: 4.89 10E12/L (ref 4.4–5.9)
SODIUM SERPL-SCNC: 142 MMOL/L (ref 133–144)
TROPONIN I SERPL-MCNC: <0.015 UG/L (ref 0–0.04)
WBC # BLD AUTO: 3.9 10E9/L (ref 4–11)

## 2020-12-09 PROCEDURE — 71046 X-RAY EXAM CHEST 2 VIEWS: CPT

## 2020-12-09 PROCEDURE — 80053 COMPREHEN METABOLIC PANEL: CPT | Performed by: EMERGENCY MEDICINE

## 2020-12-09 PROCEDURE — 85025 COMPLETE CBC W/AUTO DIFF WBC: CPT | Performed by: EMERGENCY MEDICINE

## 2020-12-09 PROCEDURE — 84484 ASSAY OF TROPONIN QUANT: CPT | Performed by: EMERGENCY MEDICINE

## 2020-12-09 PROCEDURE — 93005 ELECTROCARDIOGRAM TRACING: CPT

## 2020-12-09 PROCEDURE — 99285 EMERGENCY DEPT VISIT HI MDM: CPT | Mod: 25

## 2020-12-09 ASSESSMENT — ENCOUNTER SYMPTOMS: FEVER: 0

## 2020-12-09 NOTE — ED TRIAGE NOTES
Pt presents with complaints of chest discomfort since yesterday that is intermittent. Pt reports 2 episodes of central chest pain yesterday and again today. Pt reports pain now 3/10. Denies cardiac hx and has no symptoms with chest discomfort.

## 2020-12-09 NOTE — ED AVS SNAPSHOT
[facility] Emergency Dept  [address] [address]  Phone: [phone]  Fax: [phone]                                    Padilla Damon   MRN: [id number]    Department: [facility] Emergency Dept   Date of Visit: 12/9/2020           After Visit Summary Signature Page    I have received my discharge instructions, and my questions have been answered. I have discussed any challenges I see with this plan with the nurse or doctor.    ..........................................................................................................................................  Patient/Patient Representative Signature      ..........................................................................................................................................  Patient Representative Print Name and Relationship to Patient    ..................................................               ................................................  Date                                   Time    ..........................................................................................................................................  Reviewed by Signature/Title    ...................................................              ..............................................  Date                                               Time          22EPIC Rev 08/18

## 2020-12-09 NOTE — ED PROVIDER NOTES
"History     Chief Complaint:  Chest Pain    HPI  Padilla Damon is a 50 year old male who presents for evaluation of chest pain. The patient reports that he has been having intermittent episodes of chest discomfort starting yesterday morning. He has had 3 episodes one yesterday morning, yesterday evening, and one this morning. He comes in for this because the discomfort episode he had this morning also included what he describes as a \"surge of pain\". He states he has been exercising normally yesterday, riding his bike for 30 minutes to peak heart rate, and today and has had no pain while exercising. He describes his pain as a throbbing type pain and slight tightness discomfort lasting up to 20 minutes in the midsternal area. He notes he hasn't had pain like this in the past and just wanted to make sure nothing serious was occurring. He denies any fever or known COVID exposure at this time.     CARDIAC RISK FACTORS:  Sex: male                                                             Tobacco/Illicit drugs- Negative          Hypertension - Negative                                  Hyperlipidemia- Negative                      Diabetes- Negative                                          Family History- Negative                        Personal History- Negative                      PE/DVT RISK FACTORS:  Sex: male                                                            Hormones- Negative                                        Tobacco- Negative                                          Cancer- Negative                                             Travel- Negative                                              Surgery- Negative                                            Other immobilization- Negative              Personal history of PE/DVT- Negative                     Family history of PE/DVT- Negative                             Allergies:  No Known Allergies    Medications:    Naproxen    Past Medical History:  "   Adenoma of Colon  Patellofemoral syndrome of left knee.   Segmental and somatic dysfunction of pelvic region.     Past Surgical History:    The patient does not have any pertinent past surgical history.    Family History:    Alzheimer's  Prostate Cancer  Hypertension    Social History:  The patient presents to the ED alone.   Marital Status:   Tobacco Use: Never  Alcohol Use: No  Drug Use: No    Review of Systems   Constitutional: Negative for fever.   Cardiovascular: Positive for chest pain.   All other systems reviewed and are negative.    Physical Exam     Patient Vitals for the past 24 hrs:   BP Temp Temp src Pulse Resp SpO2 Weight   12/09/20 1422 -- -- -- 53 10 100 % --   12/09/20 1420 131/81 -- -- 53 -- -- --   12/09/20 1302 115/64 98  F (36.7  C) Temporal 56 20 99 % 65.8 kg (145 lb)     Physical Exam  Nursing notes reviewed. Vitals reviewed.  General: Alert. Well kept.  Eyes:  Conjunctiva non-injected, non-icteric.  Neck/Throat: Moist mucous membranes.  Normal voice.  Cardiac: Regular rhythm. Normal heart sounds with no murmur/rubs/click.   Pulmonary: Clear and equal breath sounds bilaterally. No crackles/rales. No wheezing  Abdomen: Soft. Non-distended. Non-tender to palpation. No masses. No guarding or rebound.  Musculoskeletal: Normal gross range of motion of all 4 extremities.    Neurological: Alert and oriented x4.   Skin: Warm and dry without rashes or petechiae. Normal appearance of visualized exposed skin.  Psych: Affect normal. Good eye contact.    Emergency Department Course     ECG:  Indication: Chest Pain  Completed at 1302.  Read at 1310.   Sinus bradycardia. Minimal voltage criteria for LVH, may be normal variant Borderline ECG.   Rate 57 bpm. MT interval 138. QRS duration 88. QT/QTc 400/389. P-R-T axes 41 77 63.    Imaging:  Radiology findings were communicated with the patient who voiced understanding of the findings.    Chest XR, PA & LAT:  Negative exam. Report per radiology      Laboratory:  Laboratory findings were communicated with the patient who voiced understanding of the findings.    CBC: WBC 3.9 (L), HGB 14.8,     CMP: All WNL (Creat 0.86)    Troponin: <0.015     Emergency Department Course:  Past medical records, nursing notes, and vitals reviewed.     EKG obtained in the ED, see results above.      IV was inserted and blood was drawn for laboratory testing, results above.    1432 I performed an exam of the patient as documented above.      The patient was sent for a xr chest while in the emergency department, results above.     1509 I rechecked the patient and discussed the results of his workup thus far. Plan of care discussed and questions answered.     Findings and plan explained to the Patient. Patient discharged home with instructions regarding supportive care, medications, and reasons to return. The importance of close follow-up was reviewed.    I personally reviewed the laboratory and imaging results with the Patient and answered all related questions prior to discharge.     Impression & Plan     Medical Decision Making:  Padilla Damon is a 50 year old year old patient who presents to the ED today with chest pain.  The differential diagnosis of chest pain is broad and includes but is not limited to life threatening etiologies such as Acute coronary syndrome, Myocardial infarction, Pulmonary Embolism, Acute Aortic Dissection. The patient is PERC negative with exception of age and with no tachycardia, hypoxia or shortness of breath, PE is considered unlikely.  I also considered pneumoniae, pneumothorax, pericarditis, pleurisy, esophageal spasm. No serious etiology for the chest pain were detected today during this visit.  HEART score is 1 with 0.9-1.7% risk  For 30-day major adverse cardiac event and is appropriate for outpatient follow-up. The patient was instructed to return to the ED with new or worse symptoms and follow up with their Primary Care Provider in  1-3 days for ongoing evaluation and management.  The patient should return if increasing pain, shortness of breath or fever.       HEART Score  Background  Calculates the overall risk of adverse event in patient's presenting with chest pain.  Based on 5 criteria (each assigned 0-2 points) including suspiciousness of history, EKG, age, risk factors and troponin.    Data  50 year old male  has Patellofemoral syndrome of left knee and Segmental and somatic dysfunction of pelvic region on their problem list.   reports that he has never smoked. He has never used smokeless tobacco.  family history is not on file.  Lab Results   Component Value Date    TROPI <0.015 12/09/2020     Criteria   0-2 points for each of 5 items (maximum of 10 points):  Score 0- History slightly suspicious for coronary syndrome  Score 0- EKG Normal  Score 1- Age 45 to 65 years old  Score 0- No risk factors for atherosclerotic disease  Score 0- Within normal limits for troponin levels  Interpretation  Risk of adverse outcome  Heart Score: 1  Total Score 0-3- Adverse Outcome Risk 2.5% - Supports early discharge with appropriate follow-up    Diagnosis:    ICD-10-CM    1. Chest pain  R07.9        Disposition:  Discharged to home.      Scribe Disclosure:  I, Wyatt Zhao, am serving as a scribe at 2:22 PM on 12/9/2020 to document services personally performed by Yolanda Carranza CNP based on my observations and the provider's statements to me.      Yolanda Carranza CNP  12/09/20 1535

## 2020-12-27 ENCOUNTER — HEALTH MAINTENANCE LETTER (OUTPATIENT)
Age: 50
End: 2020-12-27

## 2021-04-25 ENCOUNTER — HEALTH MAINTENANCE LETTER (OUTPATIENT)
Age: 51
End: 2021-04-25

## 2021-10-09 ENCOUNTER — HEALTH MAINTENANCE LETTER (OUTPATIENT)
Age: 51
End: 2021-10-09

## 2022-05-21 ENCOUNTER — HEALTH MAINTENANCE LETTER (OUTPATIENT)
Age: 52
End: 2022-05-21

## 2022-09-17 ENCOUNTER — HEALTH MAINTENANCE LETTER (OUTPATIENT)
Age: 52
End: 2022-09-17

## 2022-12-02 ENCOUNTER — TELEPHONE (OUTPATIENT)
Dept: ORTHOPEDICS | Facility: CLINIC | Age: 52
End: 2022-12-02

## 2022-12-02 NOTE — TELEPHONE ENCOUNTER
[facility] Call Center    Phone Message    May a detailed message be left on voicemail: yes     Reason for Call: Other: Pt was scheduled for surgery with Dr. Krause in 2019. Pt canceled surgery and was unable to reschedule.  Pt calling back - pt having pain - asking  if Dr Krause would order an MRI and then pt would come in for consult for  MRI results and discuss possible surgery options.       Left knee, torn ACL, Meniscus tear and also other complex tear due to martial arts.      Pt would like call back - # [phone] or a message can be sent through my chart -communicate through my chart.       Action Taken: Message routed to:  [facility] UMP:  Dr. Scottie Krause    Travel Screening: Not Applicable

## 2022-12-05 DIAGNOSIS — M25.562 CHRONIC PAIN OF LEFT KNEE: Primary | ICD-10-CM

## 2022-12-05 DIAGNOSIS — G89.29 CHRONIC PAIN OF LEFT KNEE: Primary | ICD-10-CM

## 2022-12-20 ENCOUNTER — ANCILLARY PROCEDURE (OUTPATIENT)
Dept: MRI IMAGING | Facility: CLINIC | Age: 52
End: 2022-12-20
Attending: ORTHOPAEDIC SURGERY
Payer: COMMERCIAL

## 2022-12-20 DIAGNOSIS — G89.29 CHRONIC PAIN OF LEFT KNEE: ICD-10-CM

## 2022-12-20 DIAGNOSIS — M25.562 CHRONIC PAIN OF LEFT KNEE: ICD-10-CM

## 2022-12-29 NOTE — TELEPHONE ENCOUNTER
DIAGNOSIS: MRI results Knee   APPOINTMENT DATE: 1/9/23   NOTES STATUS DETAILS   OFFICE NOTE from other specialist Internal 4/1/19 OV Aureliano Krause MD    3/13/19 OV Vasyl Brenner,    MEDICATION LIST Internal    LABS     CBC/DIFF Care Everywhere 6/7/21 -  [facility]    MRI Internal 12/20/22 MR LEFT KNEE   3/13/19 MR LEFT KNEE    XRAYS (IMAGES & REPORTS) Internal 3/13/19 XR LEFT KNEE

## 2023-01-09 ENCOUNTER — PRE VISIT (OUTPATIENT)
Dept: ORTHOPEDICS | Facility: CLINIC | Age: 53
End: 2023-01-09

## 2023-01-09 ENCOUNTER — OFFICE VISIT (OUTPATIENT)
Dept: ORTHOPEDICS | Facility: CLINIC | Age: 53
End: 2023-01-09
Payer: COMMERCIAL

## 2023-01-09 VITALS — HEIGHT: 69 IN | BODY MASS INDEX: 21.48 KG/M2 | WEIGHT: 145 LBS

## 2023-01-09 DIAGNOSIS — G89.29 CHRONIC PAIN OF LEFT KNEE: Primary | ICD-10-CM

## 2023-01-09 DIAGNOSIS — M25.562 CHRONIC PAIN OF LEFT KNEE: Primary | ICD-10-CM

## 2023-01-09 PROCEDURE — 99204 OFFICE O/P NEW MOD 45 MIN: CPT | Mod: GC | Performed by: ORTHOPAEDIC SURGERY

## 2023-01-09 NOTE — PROGRESS NOTES
Procedure: ACL reconstruction and meniscus surgery  Facility: [facility]  Length: 90 minutes  Anesthesia: Choice  Post-op appointments needed: 2 weeks provider only, 6 weeks with provider only.  Surgery packet/instructions given to patient?  Yes     Pre-Operative Teaching Flowsheet     Person(s) involved in teaching: Patient     Motivation Level:  Receptive (willing/able to accept information) and asks appropriate questions where applicable: Yes  Any cultural factors/Islam beliefs that may influence understanding or compliance? No     Patient demonstrates understanding of the following:  Pre-operative planning, including the necessary appointments and preparation needed prior to surgery: Yes  Which situations necessitate calling provider and whom to contact: Yes  Pain management techniques pre and post op: Yes  How, and when, to access community resources: Yes    Who will stay/ with patient after surgery: Wife Bia  Who will drive patient to surgery: Wife Bia  [facility] at [facility] after surgery  PCP at [facility].          Additional Teaching Concerns Addressed:   Post-operative living arrangements and necessary adaptations to living environment.     Instructional Materials Used/Given: Yes, pre-op packet given including forms for Your surgery day, medications to stop taking before surgery, preparing for surgery, Covid-19 testing, showering before surgery, Stop light tool introduced, Opioid pain medication guideline, pre-op physical form, and map  Patient expressed understanding of all forms given, questions were answered and will review in more detail at home.     Time spent with patient: 20 minutes.

## 2023-01-09 NOTE — PROGRESS NOTES
Patient seen and examined with the resident. I also personally reviewed the images and interpreted the imaging myself.     Assesment: chronic acl tear    Chronic meniscus tear    Worsened after new subluxation event    Plan: offered ACL reconstruction hamstring autograft, meniscus surgery.  I told him that I thought it was 60% chance of  meniscectomy and 40% chance of meniscus repair.  He understands that if we do a meniscus repair there is no reason to not treat his ACL.  At this time he is most interested in both ACL and meniscus surgery though I do concede that given his chronic ACL deficient knee we could consider treating only his meniscus with a meniscectomy.    This obviously would not improve the stability of his knee.    The surgical plan at this time is examination under anesthesia left knee, left knee arthroscopy, ACL reconstruction hamstring autograft, meniscus surgery.  We discussed the pros cons risks and benefits.  We discussed the expected course recovery alternative treatment options.  We will look for time when this could be completed.  He is thinking maybe in March 2023    I agree with history, physical and imaging as well as the assessment and plan as detailed by Dr. Hernandez.

## 2023-01-09 NOTE — PROGRESS NOTES
CHIEF CONCERN: L knee chronic ACL tear, medial meniscus tear    HISTORY:   52-year-old male previously seen in 2019 by Dr. Navarro with a left knee ACL tear and left knee medial meniscus tear.  Recommended surgical intervention for him at that time, however he opted to proceed with nonoperative management.     He did quite well with nonoperative management until about 6 weeks ago up until about 6 weeks ago.  He had previously been able to run in a straight line without any issues.  However he did jump kick while doing taekwFreevero resulting in a subluxation event with his left knee and subsequent pain and swelling. Also having mechanical catching and locking symptoms. He therefore presents to clinic today for evaluation.      A new MRI was obtained which demonstrates natural history and progression of his above known injuries, but no new injuries.    Interested today in learning about surgical options.    PAST MEDICAL HISTORY: (Reviewed with the patient and in the [facility] medical record)  1. none    PAST SURGICAL HISTORY: (Reviewed with the patient and in the [facility] medical record)  1. none    MEDICATIONS: (Reviewed with the patient and in the [facility] medical record)    Notable medications include: none    ALLERGIES: (Reviewed with the patient and in the [facility] medical record)  1. none      SOCIAL HISTORY: (Reviewed with the patient and in the medical record)  --Tobacco: nonsmo onker  --Occupation:  of [employer]  --Avocation/Sport: Running, tae julien do, interested in potentially doing brazilian jujitsu    FAMILY HISTORY: (Reviewed with the patient and in the medical record)  -- No family history of bleeding, clotting, or difficulty with anesthesia    REVIEW OF SYSTEMS: (Reviewed with the patient and on the health intake form)  -- A comprehensive 10 point review of systems was conducted and is negative except as noted in the HPI    EXAM:     General: Awake, Alert and Oriented, No acute Distress. Articulate and  Interactive    Body mass index is 21.41 kg/m .    Left Lower extremity :    Skin is Warm and Well perfused, no suggestion of infection    TTP medial joint line, NTTP lateral, patella, TT    ROM 0-145    Positive Lachman, positive single-leg squat and [place]ss[place]    Stable to varus/valgus/posterior drawer    EHL/FHL/TA/GS 5/5    Sensation intact L3-S1    2+ Dorsalis Pedis Pulse    IMAGING:  L knee MRI 2022 demonstrates sequela of prior ACL tear and mild progression of multidirectional tear of posterior horn and body of the medial meniscus.  No new injuries compared to prior MRI on 3/13/2019.    ASSESSMENT:  52M w/ L knee pain and the followin. L chronic ACL tear (with one recent instability subluxation event)  2. L medial meniscus tear (with pain and mechanical symptoms)    PLAN:  Had a long discussion with the patient today regarding his treatment options. Option 1 is nonoperative treatment, which has worked well for him in the past.  Option 2 is to treat his pain with just an arthroscopic medial meniscectomy.  Option 3 is to treat his pain and instability with an arthroscopic medial meniscus repair versus meniscectomy as well as an ACL reconstruction with hamstring tendon autograft.    Discussed risks and benefits of surgery including anesthesia, bleeding, infection, stiffness postoperatively.  Discussed anticipated recovery with all options.  After much discussion, patient wants to think about it but is leaning towards ACL reconstruction and meniscus surgery in the spring.    - Did preop discussion with patient today  - Will plan for L knee ACL recon and meniscus repair vs menisectomy this Spring    Seen and discussed w/ Dr. Krause.    Flo Hernandez MD  Orthopaedic Surgery, PGY-5  Pager: [phone]    Answers for HPI/ROS submitted by the patient on 2023  General Symptoms: No  Skin Symptoms: No  HENT Symptoms: No  EYE SYMPTOMS: No  HEART SYMPTOMS: No  LUNG SYMPTOMS: No  INTESTINAL SYMPTOMS:  No  URINARY SYMPTOMS: No  REPRODUCTIVE SYMPTOMS: No  SKELETAL SYMPTOMS: No  BLOOD SYMPTOMS: No  NERVOUS SYSTEM SYMPTOMS: No  MENTAL HEALTH SYMPTOMS: No

## 2023-01-09 NOTE — LETTER
1/9/2023         RE: Padilla Damon  [address] [facility] [address]        Dear Colleague,    Thank you for referring your patient, Padilla Damon, to the [facility] [place]. Please see a copy of my visit note below.    CHIEF CONCERN: L knee chronic ACL tear, medial meniscus tear    HISTORY:   52-year-old male previously seen in 2019 by Dr. Navarro with a left knee ACL tear and left knee medial meniscus tear.  Recommended surgical intervention for him at that time, however he opted to proceed with nonoperative management.     He did quite well with nonoperative management until about 6 weeks ago up until about 6 weeks ago.  He had previously been able to run in a straight line without any issues.  However he did jump kick while doing taekwondo resulting in a subluxation event with his left knee and subsequent pain and swelling. Also having mechanical catching and locking symptoms. He therefore presents to clinic today for evaluation.      A new MRI was obtained which demonstrates natural history and progression of his above known injuries, but no new injuries.    Interested today in learning about surgical options.    PAST MEDICAL HISTORY: (Reviewed with the patient and in the [facility] medical record)  1. none    PAST SURGICAL HISTORY: (Reviewed with the patient and in the [facility] medical record)  1. none    MEDICATIONS: (Reviewed with the patient and in the [facility] medical record)    Notable medications include: none    ALLERGIES: (Reviewed with the patient and in the [facility] medical record)  1. none      SOCIAL HISTORY: (Reviewed with the patient and in the medical record)  --Tobacco: nonsmo onker  --Occupation:  of "[employer]  --Avocation/Sport: Running, tae julien do, interested in potentially doing brazilian jujitsu    FAMILY HISTORY: (Reviewed with the patient and in the medical record)  -- No family history of bleeding, clotting, or difficulty with anesthesia    REVIEW OF SYSTEMS:  (Reviewed with the patient and on the health intake form)  -- A comprehensive 10 point review of systems was conducted and is negative except as noted in the HPI    EXAM:     General: Awake, Alert and Oriented, No acute Distress. Articulate and Interactive    Body mass index is 21.41 kg/m .    Left Lower extremity :    Skin is Warm and Well perfused, no suggestion of infection    TTP medial joint line, NTTP lateral, patella, TT    ROM 0-145    Positive Lachman, positive single-leg squat and [place]    Stable to varus/valgus/posterior drawer    EHL/FHL/TA/GS 5/5    Sensation intact L3-S1    2+ Dorsalis Pedis Pulse    IMAGING:  L knee MRI 2022 demonstrates sequela of prior ACL tear and mild progression of multidirectional tear of posterior horn and body of the medial meniscus.  No new injuries compared to prior MRI on 3/13/2019.    ASSESSMENT:  52M w/ L knee pain and the followin. L chronic ACL tear (with one recent instability subluxation event)  2. L medial meniscus tear (with pain and mechanical symptoms)    PLAN:  Had a long discussion with the patient today regarding his treatment options. Option 1 is nonoperative treatment, which has worked well for him in the past.  Option 2 is to treat his pain with just an arthroscopic medial meniscectomy.  Option 3 is to treat his pain and instability with an arthroscopic medial meniscus repair versus meniscectomy as well as an ACL reconstruction with hamstring tendon autograft.    Discussed risks and benefits of surgery including anesthesia, bleeding, infection, stiffness postoperatively.  Discussed anticipated recovery with all options.  After much discussion, patient wants to think about it but is leaning towards ACL reconstruction and meniscus surgery in the spring.    - Did preop discussion with patient today  - Will plan for L knee ACL recon and meniscus repair vs menisectomy this Spring    Seen and discussed w/ Dr. Krause.    Flo Hernandez MD  Orthopaedic  Surgery, PGY-5  Pager: [phone]    Answers for HPI/ROS submitted by the patient on 1/9/2023  General Symptoms: No  Skin Symptoms: No  HENT Symptoms: No  EYE SYMPTOMS: No  HEART SYMPTOMS: No  LUNG SYMPTOMS: No  INTESTINAL SYMPTOMS: No  URINARY SYMPTOMS: No  REPRODUCTIVE SYMPTOMS: No  SKELETAL SYMPTOMS: No  BLOOD SYMPTOMS: No  NERVOUS SYSTEM SYMPTOMS: No  MENTAL HEALTH SYMPTOMS: No        Patient seen and examined with the resident. I also personally reviewed the images and interpreted the imaging myself.     Assesment: chronic acl tear    Chronic meniscus tear    Worsened after new subluxation event    Plan: offered ACL reconstruction hamstring autograft, meniscus surgery.  I told him that I thought it was 60% chance of  meniscectomy and 40% chance of meniscus repair.  He understands that if we do a meniscus repair there is no reason to not treat his ACL.  At this time he is most interested in both ACL and meniscus surgery though I do concede that given his chronic ACL deficient knee we could consider treating only his meniscus with a meniscectomy.    This obviously would not improve the stability of his knee.    The surgical plan at this time is examination under anesthesia left knee, left knee arthroscopy, ACL reconstruction hamstring autograft, meniscus surgery.  We discussed the pros cons risks and benefits.  We discussed the expected course recovery alternative treatment options.  We will look for time when this could be completed.  He is thinking maybe in March 2023    I agree with history, physical and imaging as well as the assessment and plan as detailed by Dr. Hernandez.       Procedure: ACL reconstruction and meniscus surgery  Facility: [facility]  Length: 90 minutes  Anesthesia: Choice  Post-op appointments needed: 2 weeks provider only, 6 weeks with provider only.  Surgery packet/instructions given to patient?  Yes     Pre-Operative Teaching Flowsheet     Person(s) involved in teaching: Patient      Motivation Level:  Receptive (willing/able to accept information) and asks appropriate questions where applicable: Yes  Any cultural factors/Church beliefs that may influence understanding or compliance? No     Patient demonstrates understanding of the following:  Pre-operative planning, including the necessary appointments and preparation needed prior to surgery: Yes  Which situations necessitate calling provider and whom to contact: Yes  Pain management techniques pre and post op: Yes  How, and when, to access community resources: Yes    Who will stay/ with patient after surgery: Wife Bia  Who will drive patient to surgery: Wife Bia  Pt at [facility] after surgery  PCP at [facility].          Additional Teaching Concerns Addressed:   Post-operative living arrangements and necessary adaptations to living environment.     Instructional Materials Used/Given: Yes, pre-op packet given including forms for Your surgery day, medications to stop taking before surgery, preparing for surgery, Covid-19 testing, showering before surgery, Stop light tool introduced, Opioid pain medication guideline, pre-op physical form, and map  Patient expressed understanding of all forms given, questions were answered and will review in more detail at home.     Time spent with patient: 20 minutes.          Again, thank you for allowing me to participate in the care of your patient.        Sincerely,        Aureliano Krause MD

## 2023-01-10 ENCOUNTER — TELEPHONE (OUTPATIENT)
Dept: ORTHOPEDICS | Facility: CLINIC | Age: 53
End: 2023-01-10
Payer: COMMERCIAL

## 2023-01-10 NOTE — TELEPHONE ENCOUNTER
Phoned patient to schedule surgery with Dr Krause. I left him my direct number to call when he is able. [phone]

## 2023-01-23 PROBLEM — G89.29 CHRONIC PAIN OF LEFT KNEE: Status: ACTIVE | Noted: 2023-01-23

## 2023-01-23 PROBLEM — M25.562 CHRONIC PAIN OF LEFT KNEE: Status: ACTIVE | Noted: 2023-01-23

## 2023-01-23 NOTE — TELEPHONE ENCOUNTER
Patient is scheduled for surgery with Dr. Krause    Spoke with: Patient    Date of Surgery: 3/7/23    Location: [facility]    Informed patient they will need an adult  : Yes    Post op: 1 & 6 weeks    Pre op with Provider: Complete    H&P: Will schedule with PCP    Pre-procedure COVID-19 Test: N/A    Additional imaging/appointments: N/A    Surgery packet: Received      Additional comments: N/A

## 2023-02-23 ENCOUNTER — TELEPHONE (OUTPATIENT)
Dept: ORTHOPEDICS | Facility: CLINIC | Age: 53
End: 2023-02-23
Payer: COMMERCIAL

## 2023-02-23 NOTE — TELEPHONE ENCOUNTER
Health Call Center    Phone Message    May a detailed message be left on voicemail: yes     Reason for Call: Other: Patient is requesting a call back for 2 reasons: had to reschedule his PRE-OP for Mon, 2/27 and wants to make sure that it's not too close to the surgery date which is 3/7; and secondly, patient is hoping for an exact time for his surgery as his wiife wants to be there and needs to be home by 2 PM for  duties.     Please call patient back to discuss.    Action Taken: Message routed to:  [facility] ([facility] ortho    Travel Screening: Not Applicable

## 2023-03-06 ENCOUNTER — ANESTHESIA EVENT (OUTPATIENT)
Dept: SURGERY | Facility: AMBULATORY SURGERY CENTER | Age: 53
End: 2023-03-06
Payer: COMMERCIAL

## 2023-03-06 RX ORDER — OXYCODONE HYDROCHLORIDE 5 MG/1
5 TABLET ORAL
Status: CANCELLED | OUTPATIENT
Start: 2023-03-06

## 2023-03-06 RX ORDER — OXYCODONE HYDROCHLORIDE 5 MG/1
10 TABLET ORAL
Status: CANCELLED | OUTPATIENT
Start: 2023-03-06

## 2023-03-06 NOTE — ANESTHESIA PREPROCEDURE EVALUATION
Anesthesia Pre-Procedure Evaluation    Patient: Padilla Damon   MRN: [id number] : 1970        Procedure : Procedure(s):  left knee examination under anesthesia, knee arthroscopy, anterior cruciate ligament reconstruction hamstring autograft,  meniscus surgery          No past medical history on file.   No past surgical history on file.   No Known Allergies   Social History     Tobacco Use     Smoking status: Never     Smokeless tobacco: Never   Substance Use Topics     Alcohol use: No      Wt Readings from Last 1 Encounters:   23 65.8 kg (145 lb)        Anesthesia Evaluation            ROS/MED HX  ENT/Pulmonary:       Neurologic:       Cardiovascular: Comment: Worked up for chest pain in  in ED, negative workup.       METS/Exercise Tolerance:     Hematologic:       Musculoskeletal: Comment: Patellofemoral syndrome left knee      GI/Hepatic:       Renal/Genitourinary:       Endo:       Psychiatric/Substance Use:       Infectious Disease:       Malignancy:       Other:               OUTSIDE LABS:  CBC:   Lab Results   Component Value Date    WBC 3.9 (L) 2020    WBC 5.0 2019    HGB 14.8 2020    HGB 14.8 2019    HCT 43.5 2020    HCT 42.3 2019     2020     2019     BMP:   Lab Results   Component Value Date     2020     2019    POTASSIUM 3.9 2020    POTASSIUM 3.7 2019    CHLORIDE 107 2020    CHLORIDE 103 2019    CO2 30 2020    CO2 33 (H) 2019    BUN 16 2020    BUN 20 2019    CR 0.86 2020    CR 0.98 2019    GLC 89 2020    GLC 91 2019     COAGS: No results found for: PTT, INR, FIBR  POC: No results found for: BGM, HCG, HCGS  HEPATIC:   Lab Results   Component Value Date    ALBUMIN 4.0 2020    PROTTOTAL 7.3 2020    ALT 50 2020    AST 24 2020    ALKPHOS 66 2020    BILITOTAL 0.3 2020     OTHER:   Lab Results    Component Value Date    PRAVEEN 8.8 12/09/2020    LIPASE 281 11/26/2017       Anesthesia Plan    ASA Status:  2   NPO Status:  NPO Appropriate    Anesthesia Type: General.     - Airway: LMA   Induction: Intravenous.   Maintenance: Balanced.        Consents    Anesthesia Plan(s) and associated risks, benefits, and realistic alternatives discussed. Questions answered and patient/representative(s) expressed understanding.     - Discussed: Risks, Benefits and Alternatives for BOTH SEDATION and the PROCEDURE were discussed     - Discussed with:  Patient         Postoperative Care    Pain management: IV analgesics, Oral pain medications, Peripheral nerve block (Continuous), Multi-modal analgesia.   PONV prophylaxis: Ondansetron (or other 5HT-3), Dexamethasone or Solumedrol, Background Propofol Infusion     Comments:                Fidel Lujan MD

## 2023-03-07 ENCOUNTER — ANESTHESIA (OUTPATIENT)
Dept: SURGERY | Facility: AMBULATORY SURGERY CENTER | Age: 53
End: 2023-03-07
Payer: COMMERCIAL

## 2023-03-07 ENCOUNTER — HOSPITAL ENCOUNTER (OUTPATIENT)
Facility: AMBULATORY SURGERY CENTER | Age: 53
Discharge: HOME | End: 2023-03-07
Attending: ORTHOPAEDIC SURGERY
Payer: COMMERCIAL

## 2023-03-07 VITALS
DIASTOLIC BLOOD PRESSURE: 64 MMHG | TEMPERATURE: 97.3 F | SYSTOLIC BLOOD PRESSURE: 115 MMHG | HEART RATE: 56 BPM | OXYGEN SATURATION: 99 % | BODY MASS INDEX: 21.48 KG/M2 | RESPIRATION RATE: 14 BRPM | WEIGHT: 145 LBS | HEIGHT: 69 IN

## 2023-03-07 DIAGNOSIS — S83.512D RUPTURE OF ANTERIOR CRUCIATE LIGAMENT OF LEFT KNEE, SUBSEQUENT ENCOUNTER: Primary | ICD-10-CM

## 2023-03-07 DIAGNOSIS — G89.29 CHRONIC PAIN OF LEFT KNEE: ICD-10-CM

## 2023-03-07 DIAGNOSIS — M25.562 CHRONIC PAIN OF LEFT KNEE: ICD-10-CM

## 2023-03-07 PROCEDURE — 29888 ARTHRS AID ACL RPR/AGMNTJ: CPT | Mod: LT | Performed by: ORTHOPAEDIC SURGERY

## 2023-03-07 PROCEDURE — 29881 ARTHRS KNE SRG MNISECTMY M/L: CPT | Mod: LT

## 2023-03-07 PROCEDURE — C1762 CONN TISS, HUMAN(INC FASCIA): HCPCS

## 2023-03-07 PROCEDURE — 29888 ARTHRS AID ACL RPR/AGMNTJ: CPT | Mod: LT

## 2023-03-07 PROCEDURE — 29881 ARTHRS KNE SRG MNISECTMY M/L: CPT | Mod: LT | Performed by: ORTHOPAEDIC SURGERY

## 2023-03-07 PROCEDURE — C1713 ANCHOR/SCREW BN/BN,TIS/BN: HCPCS

## 2023-03-07 PROCEDURE — C9290 INJ, BUPIVACAINE LIPOSOME: HCPCS

## 2023-03-07 DEVICE — TIGHTROPE ® II RT WITH DEPLOYING SUTURE
Type: IMPLANTABLE DEVICE | Site: KNEE | Status: FUNCTIONAL
Brand: ARTHREX®

## 2023-03-07 DEVICE — TIGHTROPE® II ABS, IMPLANT
Type: IMPLANTABLE DEVICE | Site: KNEE | Status: FUNCTIONAL
Brand: ARTHREX®

## 2023-03-07 DEVICE — TIGHTROPE ABS BUTTON ROUND 11MM CONCAVE
Type: IMPLANTABLE DEVICE | Site: KNEE | Status: FUNCTIONAL
Brand: ARTHREX®

## 2023-03-07 RX ORDER — BUPIVACAINE HYDROCHLORIDE 2.5 MG/ML
INJECTION, SOLUTION EPIDURAL; INFILTRATION; INTRACAUDAL
Status: COMPLETED | OUTPATIENT
Start: 2023-03-07 | End: 2023-03-07

## 2023-03-07 RX ORDER — PROPOFOL 10 MG/ML
INJECTION, EMULSION INTRAVENOUS PRN
Status: DISCONTINUED | OUTPATIENT
Start: 2023-03-07 | End: 2023-03-07

## 2023-03-07 RX ORDER — PROPOFOL 10 MG/ML
INJECTION, EMULSION INTRAVENOUS CONTINUOUS PRN
Status: DISCONTINUED | OUTPATIENT
Start: 2023-03-07 | End: 2023-03-07

## 2023-03-07 RX ORDER — HYDROXYZINE HYDROCHLORIDE 25 MG/1
25 TABLET, FILM COATED ORAL 3 TIMES DAILY PRN
Qty: 20 TABLET | Refills: 0 | Status: SHIPPED | OUTPATIENT
Start: 2023-03-07

## 2023-03-07 RX ORDER — FENTANYL CITRATE 50 UG/ML
50 INJECTION, SOLUTION INTRAMUSCULAR; INTRAVENOUS EVERY 5 MIN PRN
Status: DISCONTINUED | OUTPATIENT
Start: 2023-03-07 | End: 2023-03-08 | Stop reason: HOSPADM

## 2023-03-07 RX ORDER — FENTANYL CITRATE 50 UG/ML
INJECTION, SOLUTION INTRAMUSCULAR; INTRAVENOUS PRN
Status: DISCONTINUED | OUTPATIENT
Start: 2023-03-07 | End: 2023-03-07

## 2023-03-07 RX ORDER — ACETAMINOPHEN 325 MG/1
975 TABLET ORAL ONCE
Status: COMPLETED | OUTPATIENT
Start: 2023-03-07 | End: 2023-03-07

## 2023-03-07 RX ORDER — OXYCODONE HYDROCHLORIDE 5 MG/1
5-10 TABLET ORAL EVERY 4 HOURS PRN
Qty: 20 TABLET | Refills: 0 | Status: SHIPPED | OUTPATIENT
Start: 2023-03-07

## 2023-03-07 RX ORDER — HYDROMORPHONE HYDROCHLORIDE 1 MG/ML
0.4 INJECTION, SOLUTION INTRAMUSCULAR; INTRAVENOUS; SUBCUTANEOUS EVERY 5 MIN PRN
Status: DISCONTINUED | OUTPATIENT
Start: 2023-03-07 | End: 2023-03-08 | Stop reason: HOSPADM

## 2023-03-07 RX ORDER — SODIUM CHLORIDE, SODIUM LACTATE, POTASSIUM CHLORIDE, CALCIUM CHLORIDE 600; 310; 30; 20 MG/100ML; MG/100ML; MG/100ML; MG/100ML
INJECTION, SOLUTION INTRAVENOUS CONTINUOUS
Status: DISCONTINUED | OUTPATIENT
Start: 2023-03-07 | End: 2023-03-08 | Stop reason: HOSPADM

## 2023-03-07 RX ORDER — ONDANSETRON 2 MG/ML
INJECTION INTRAMUSCULAR; INTRAVENOUS PRN
Status: DISCONTINUED | OUTPATIENT
Start: 2023-03-07 | End: 2023-03-07

## 2023-03-07 RX ORDER — HYDROMORPHONE HYDROCHLORIDE 1 MG/ML
0.2 INJECTION, SOLUTION INTRAMUSCULAR; INTRAVENOUS; SUBCUTANEOUS EVERY 5 MIN PRN
Status: DISCONTINUED | OUTPATIENT
Start: 2023-03-07 | End: 2023-03-08 | Stop reason: HOSPADM

## 2023-03-07 RX ORDER — BUPIVACAINE HYDROCHLORIDE AND EPINEPHRINE 2.5; 5 MG/ML; UG/ML
INJECTION, SOLUTION INFILTRATION; PERINEURAL PRN
Status: DISCONTINUED | OUTPATIENT
Start: 2023-03-07 | End: 2023-03-07 | Stop reason: HOSPADM

## 2023-03-07 RX ORDER — NAPROXEN 500 MG/1
500 TABLET ORAL 2 TIMES DAILY PRN
COMMUNITY
End: 2023-03-07

## 2023-03-07 RX ORDER — HYDROXYZINE HYDROCHLORIDE 25 MG/1
25 TABLET, FILM COATED ORAL
Status: CANCELLED | OUTPATIENT
Start: 2023-03-07

## 2023-03-07 RX ORDER — ONDANSETRON 4 MG/1
4 TABLET, ORALLY DISINTEGRATING ORAL EVERY 30 MIN PRN
Status: DISCONTINUED | OUTPATIENT
Start: 2023-03-07 | End: 2023-03-08 | Stop reason: HOSPADM

## 2023-03-07 RX ORDER — LIDOCAINE 40 MG/G
CREAM TOPICAL
Status: DISCONTINUED | OUTPATIENT
Start: 2023-03-07 | End: 2023-03-08 | Stop reason: HOSPADM

## 2023-03-07 RX ORDER — FLUMAZENIL 0.1 MG/ML
0.2 INJECTION, SOLUTION INTRAVENOUS
Status: DISCONTINUED | OUTPATIENT
Start: 2023-03-07 | End: 2023-03-08 | Stop reason: HOSPADM

## 2023-03-07 RX ORDER — ACETAMINOPHEN 325 MG/1
650 TABLET ORAL
Status: CANCELLED | OUTPATIENT
Start: 2023-03-07

## 2023-03-07 RX ORDER — NALOXONE HYDROCHLORIDE 0.4 MG/ML
0.4 INJECTION, SOLUTION INTRAMUSCULAR; INTRAVENOUS; SUBCUTANEOUS
Status: DISCONTINUED | OUTPATIENT
Start: 2023-03-07 | End: 2023-03-08 | Stop reason: HOSPADM

## 2023-03-07 RX ORDER — ACETAMINOPHEN 325 MG/1
975 TABLET ORAL ONCE
Status: DISCONTINUED | OUTPATIENT
Start: 2023-03-07 | End: 2023-03-08 | Stop reason: HOSPADM

## 2023-03-07 RX ORDER — CEFAZOLIN SODIUM 2 G/50ML
2 SOLUTION INTRAVENOUS
Status: COMPLETED | OUTPATIENT
Start: 2023-03-07 | End: 2023-03-07

## 2023-03-07 RX ORDER — ACETAMINOPHEN 325 MG/1
650 TABLET ORAL EVERY 4 HOURS PRN
Qty: 50 TABLET | Refills: 0 | Status: SHIPPED | OUTPATIENT
Start: 2023-03-07

## 2023-03-07 RX ORDER — FENTANYL CITRATE 50 UG/ML
25 INJECTION, SOLUTION INTRAMUSCULAR; INTRAVENOUS EVERY 5 MIN PRN
Status: DISCONTINUED | OUTPATIENT
Start: 2023-03-07 | End: 2023-03-08 | Stop reason: HOSPADM

## 2023-03-07 RX ORDER — NALOXONE HYDROCHLORIDE 0.4 MG/ML
0.2 INJECTION, SOLUTION INTRAMUSCULAR; INTRAVENOUS; SUBCUTANEOUS
Status: DISCONTINUED | OUTPATIENT
Start: 2023-03-07 | End: 2023-03-08 | Stop reason: HOSPADM

## 2023-03-07 RX ORDER — AMOXICILLIN 250 MG
1-2 CAPSULE ORAL 2 TIMES DAILY
Qty: 30 TABLET | Refills: 0 | Status: SHIPPED | OUTPATIENT
Start: 2023-03-07

## 2023-03-07 RX ORDER — EPHEDRINE SULFATE 50 MG/ML
INJECTION, SOLUTION INTRAMUSCULAR; INTRAVENOUS; SUBCUTANEOUS PRN
Status: DISCONTINUED | OUTPATIENT
Start: 2023-03-07 | End: 2023-03-07

## 2023-03-07 RX ORDER — DEXAMETHASONE SODIUM PHOSPHATE 4 MG/ML
INJECTION, SOLUTION INTRA-ARTICULAR; INTRALESIONAL; INTRAMUSCULAR; INTRAVENOUS; SOFT TISSUE PRN
Status: DISCONTINUED | OUTPATIENT
Start: 2023-03-07 | End: 2023-03-07

## 2023-03-07 RX ORDER — KETOROLAC TROMETHAMINE 30 MG/ML
INJECTION, SOLUTION INTRAMUSCULAR; INTRAVENOUS PRN
Status: DISCONTINUED | OUTPATIENT
Start: 2023-03-07 | End: 2023-03-07

## 2023-03-07 RX ORDER — ONDANSETRON 4 MG/1
4 TABLET, ORALLY DISINTEGRATING ORAL EVERY 8 HOURS PRN
Qty: 4 TABLET | Refills: 0 | Status: SHIPPED | OUTPATIENT
Start: 2023-03-07

## 2023-03-07 RX ORDER — GLYCOPYRROLATE 0.2 MG/ML
INJECTION, SOLUTION INTRAMUSCULAR; INTRAVENOUS PRN
Status: DISCONTINUED | OUTPATIENT
Start: 2023-03-07 | End: 2023-03-07

## 2023-03-07 RX ORDER — CEFAZOLIN SODIUM 2 G/50ML
2 SOLUTION INTRAVENOUS SEE ADMIN INSTRUCTIONS
Status: DISCONTINUED | OUTPATIENT
Start: 2023-03-07 | End: 2023-03-08 | Stop reason: HOSPADM

## 2023-03-07 RX ORDER — ONDANSETRON 2 MG/ML
4 INJECTION INTRAMUSCULAR; INTRAVENOUS EVERY 30 MIN PRN
Status: DISCONTINUED | OUTPATIENT
Start: 2023-03-07 | End: 2023-03-08 | Stop reason: HOSPADM

## 2023-03-07 RX ORDER — FENTANYL CITRATE 50 UG/ML
25-50 INJECTION, SOLUTION INTRAMUSCULAR; INTRAVENOUS
Status: DISCONTINUED | OUTPATIENT
Start: 2023-03-07 | End: 2023-03-08 | Stop reason: HOSPADM

## 2023-03-07 RX ORDER — OXYCODONE HYDROCHLORIDE 5 MG/1
5 TABLET ORAL
Status: CANCELLED | OUTPATIENT
Start: 2023-03-07

## 2023-03-07 RX ORDER — LIDOCAINE HYDROCHLORIDE 20 MG/ML
INJECTION, SOLUTION INFILTRATION; PERINEURAL PRN
Status: DISCONTINUED | OUTPATIENT
Start: 2023-03-07 | End: 2023-03-07

## 2023-03-07 RX ORDER — LABETALOL HYDROCHLORIDE 5 MG/ML
10 INJECTION, SOLUTION INTRAVENOUS
Status: DISCONTINUED | OUTPATIENT
Start: 2023-03-07 | End: 2023-03-08 | Stop reason: HOSPADM

## 2023-03-07 RX ORDER — ONDANSETRON 4 MG/1
4 TABLET, ORALLY DISINTEGRATING ORAL
Status: CANCELLED | OUTPATIENT
Start: 2023-03-07

## 2023-03-07 RX ADMIN — ACETAMINOPHEN 975 MG: 325 TABLET ORAL at 07:08

## 2023-03-07 RX ADMIN — FENTANYL CITRATE 50 MCG: 50 INJECTION, SOLUTION INTRAMUSCULAR; INTRAVENOUS at 08:12

## 2023-03-07 RX ADMIN — PROPOFOL 150 MCG/KG/MIN: 10 INJECTION, EMULSION INTRAVENOUS at 08:12

## 2023-03-07 RX ADMIN — DEXAMETHASONE SODIUM PHOSPHATE 4 MG: 4 INJECTION, SOLUTION INTRA-ARTICULAR; INTRALESIONAL; INTRAMUSCULAR; INTRAVENOUS; SOFT TISSUE at 08:12

## 2023-03-07 RX ADMIN — LIDOCAINE HYDROCHLORIDE 100 MG: 20 INJECTION, SOLUTION INFILTRATION; PERINEURAL at 08:12

## 2023-03-07 RX ADMIN — SODIUM CHLORIDE, SODIUM LACTATE, POTASSIUM CHLORIDE, CALCIUM CHLORIDE: 600; 310; 30; 20 INJECTION, SOLUTION INTRAVENOUS at 07:28

## 2023-03-07 RX ADMIN — KETOROLAC TROMETHAMINE 15 MG: 30 INJECTION, SOLUTION INTRAMUSCULAR; INTRAVENOUS at 09:22

## 2023-03-07 RX ADMIN — BUPIVACAINE HYDROCHLORIDE 10 ML: 2.5 INJECTION, SOLUTION EPIDURAL; INFILTRATION; INTRACAUDAL at 07:51

## 2023-03-07 RX ADMIN — PROPOFOL 30 MG: 10 INJECTION, EMULSION INTRAVENOUS at 09:31

## 2023-03-07 RX ADMIN — PROPOFOL 200 MG: 10 INJECTION, EMULSION INTRAVENOUS at 08:12

## 2023-03-07 RX ADMIN — GLYCOPYRROLATE 0.2 MG: 0.2 INJECTION, SOLUTION INTRAMUSCULAR; INTRAVENOUS at 08:17

## 2023-03-07 RX ADMIN — HYDROMORPHONE HYDROCHLORIDE 0.2 MG: 1 INJECTION, SOLUTION INTRAMUSCULAR; INTRAVENOUS; SUBCUTANEOUS at 10:00

## 2023-03-07 RX ADMIN — PROPOFOL 160 MCG/KG/MIN: 10 INJECTION, EMULSION INTRAVENOUS at 09:01

## 2023-03-07 RX ADMIN — FENTANYL CITRATE 50 MCG: 50 INJECTION, SOLUTION INTRAMUSCULAR; INTRAVENOUS at 07:52

## 2023-03-07 RX ADMIN — ONDANSETRON 4 MG: 2 INJECTION INTRAMUSCULAR; INTRAVENOUS at 09:18

## 2023-03-07 RX ADMIN — HYDROMORPHONE HYDROCHLORIDE 0.2 MG: 1 INJECTION, SOLUTION INTRAMUSCULAR; INTRAVENOUS; SUBCUTANEOUS at 10:10

## 2023-03-07 RX ADMIN — CEFAZOLIN SODIUM 2 G: 2 SOLUTION INTRAVENOUS at 08:10

## 2023-03-07 RX ADMIN — FENTANYL CITRATE 50 MCG: 50 INJECTION, SOLUTION INTRAMUSCULAR; INTRAVENOUS at 08:30

## 2023-03-07 RX ADMIN — EPHEDRINE SULFATE 5 MG: 50 INJECTION, SOLUTION INTRAMUSCULAR; INTRAVENOUS; SUBCUTANEOUS at 08:20

## 2023-03-07 RX ADMIN — EPHEDRINE SULFATE 5 MG: 50 INJECTION, SOLUTION INTRAMUSCULAR; INTRAVENOUS; SUBCUTANEOUS at 08:14

## 2023-03-07 RX ADMIN — EPHEDRINE SULFATE 5 MG: 50 INJECTION, SOLUTION INTRAMUSCULAR; INTRAVENOUS; SUBCUTANEOUS at 08:17

## 2023-03-07 NOTE — DISCHARGE INSTRUCTIONS
[facility]  Home Care Following Anesthesia  For 24 hours after surgery:  Get plenty of rest.  A responsible adult must stay with you for at least 24 hours after you leave the surgery center.  Do not drive or use heavy equipment.  If you have weakness or tingling, don't drive or use heavy equipment until this feeling goes away.   Do not drink alcohol.   Avoid strenuous or risky activities.  Ask for help when climbing stairs.  You may feel lightheaded.  IF so, sit for a few minutes before standing.  Have someone help you get up.   If you have nausea (feel sick to your stomach): Drink only clear liquids such as apple juice, ginger ale, broth or 7-Up.  Rest may also help.  Be sure to drink enough fluids.  Move to a regular diet as you feel able.   You may have a slight fever.  Call the doctor if your fever is over 100 F (37.7 C) (taken under the tongue) or lasts longer than 24 hours.  You may have a dry mouth, a sore throat, muscle aches or trouble sleeping. These should go away after 24 hours.  Do not make important or legal decisions.   It is recommended to avoid smoking.               Tips for taking pain medications  To get the best pain relief possible, remember these points:  Take pain medications as directed, before pain becomes severe.  Pain medication can upset your stomach: taking it with food may help.  Constipation is a common side effect of pain medication. Drink plenty of  fluids.  Eat foods high in fiber. Take a stool softener if recommended by your doctor or pharmacist.  Do not drink alcohol, drive or operate machinery while taking pain medications.  Ask about other ways to control pain, such as with heat, ice or relaxation.    Tylenol/Acetaminophen Consumption  To help encourage the safe use of acetaminophen, the makers of TYLENOL  have lowered the maximum daily dose for single-ingredient Extra Strength TYLENOL  (acetaminophen) products sold in the [place] from 8 pills  per day (4,000 mg) to 6 pills per day (3,000 mg). The dosing interval has also changed from 2 pills every 4-6 hours to 2 pills every 6 hours.  If you feel your pain relief is insufficient, you may take Tylenol/Acetaminophen in addition to your narcotic pain medication.   Be careful not to exceed 3,000 mg of Tylenol/Acetaminophen in a 24 hour period from all sources.  If you are taking extra strength Tylenol/acetaminophen (500 mg), the maximum dose is 6 tablets in 24 hours.  If you are taking regular strength acetaminophen (325 mg), the maximum dose is 9 tablets in 24 hours.    Call a doctor for any of the following:  Signs of infection (fever, growing tenderness at the surgery site, a large amount of drainage or bleeding, severe pain, foul-smelling drainage, redness, swelling).  It has been over 8 to 10 hours since surgery and you are still not able to urinate (pass water).  Headache for over 24 hours.  Numbness, tingling or weakness the day after surgery (if you had spinal anesthesia).  Signs of Covid-19 infection (temperature over 100 degrees, shortness of breath, cough, loss of taste/smell, generalized body aches, persistent headache, chills, sore throat, nausea/vomiting/diarrhea)  Your doctor is:       Dr. Aureliano Krause, Orthopaedics: [phone]               Or dial [phone] and ask for the resident on call for:  Orthopaedics  For emergency care, call the:  [facility] Emergency Department: [phone] (TTY for hearing impaired: [phone])

## 2023-03-07 NOTE — OR NURSING
Patient received left side Adductor nerve block  with Exparel.  Fentanyl 50mcg and Versed 1mg given. Tolerated procedure well.

## 2023-03-07 NOTE — OP NOTE
PREOPERATIVE DIAGNOSIS:   1. Grade 3 rupture of the left anterior cruciate ligament  2. Medial meniscus tear left knee    POSTOPERATIVE DIAGNOSIS:  1. Grade 3 rupture of the left anterior cruciate ligament  2. Medial meniscus tear left knee, unrepairable    PROCEDURE:  1. Examination under anesthesia left knee  2. Left knee arthroscopy  3. ACL reconstruction hamstring autograft  4. Partial medial meniscectomy left knee    DATE OF SURGERY: 3/7/2023    SURGEON: Aureliano Krause MD    ASSISTANT: Kaylyn Barrios PA-C. The assistance of Ms. Barrios was necessary for positioning, arthroscopic visualization, retraction, graft preparation and graft passage.    RESIDENT OR FELLOW: None    OPERATIVE INDICATIONS: Padilla Damon is a pleasant 52 year old who I saw through my orthopedic clinic with a history, physical, imaging consistent with grade 3 rupture of the left anterior cruciate ligament and a medial meniscus tear.  We discussed the pros cons risks and benefits of surgery.  We discussed the expected course of recovery.  I discussed with him the limitations of surgery.  The risk of retear and he understood that we perform either a meniscectomy or meniscus repair.  He desired to proceed..  I reviewed with the patient the risks, benefits, complications, techniques and alternatives to surgery.  We reviewed the expected course of recovery and the potential expected outcomes.  The patient understood both the risks and benefits and desired to proceed despite the risks.    OPERATIVE DETAILS: In the preoperative area the patient's informed consent was reviewed and they desired to proceed.  The left leg was marked and the patient was in agreement.  The patient was taken to the operating room where a timeout was performed and all parties were in agreement.  Preoperative antibiotics were given within 1 hour of the time of incision.  The patient was placed in the supine position and surrendered to LMA anesthesia.  No tourniquet  was applied.  Egg crate was placed beneath the well leg and a side post was utilized.  The operative leg was prepped and draped in the usual sterile fashion.     Examination under anesthesia: Range of motion 0-135, 1 quadrant medial and 2 quadrant lateral translation of the patella, stable to varus and valgus stress testing, stable posterior drawer testing, 2+ anterior drawer testing, 2B Lachman, 1+ pivot shift    Graft harvest and preparation: A 4 cm incision was made over the anterior medial tibia.  It was carried down through the skin and subcutaneous tissues and meticulous hemostasis was insured.  The fascia was opened with an apex anterior-superior-based incision and the sartorius fascia was identified.  A marking suture was placed at this top corner and the fascia was reflected exposing the semi-tendinosis.  A right angle snap was used to free the semi-tendinosis from the overlying fascia and the adhesions were removed sharply.  Once there was adequate excursion of the tendon across the tibial tubercle, and no tenting of the gastroc fascia a small tendon stripper was introduced and the tendon was harvested free.    It was taken to the back table where a graft link technology was employed.  The graft was quadrupled, running locking fiber loop was placed on each tail and the folds of the graft were doubled over the [employer].  Circumferential sutures were placed at 1 and 2 cm.  The final graft dimensions measured 70 mm of length by 10.5 mm on the femoral side and 9.5 on the tibial side.  The graft was tensioned at 20 pounds for 20 minutes to remove any creep.    Anterior medial and anterior lateral arthroscopic portals were created and a diagnostic arthroscopy was performed with the following findings: The medial patella facet, lateral patella facet, central ridge of the patella showed normal cartilage.  The trochlear cartilage was normal.  The medial femoral condyle showed grade 1 cartilage and medial  tibial plateau showed normal cartilage. The lateral femoral condyle showed normal cartilage and lateral tibial plateau showed normal. The Medial meniscus a complex tear that was clearly unrepairable with multiple unstable fragments and Lateral Meniscus intact and stable to probing.  There was a grade 3 rupture of the anterior cruciate ligament with positive empty wall sign.  The PCL was intact.  There was no opening to varus and valgus stress testing in either the lateral or medial compartments, respectively.    A debridement of the nonfunctioning ACL was then performed until we could visualize the anatomic insertion sites of the ACL on both the femoral and the tibial origin.  Remnant preservation was pursued in the tibial side and the tibial tunnel was centered midway between the medial and lateral tibial spines in line with the posterior aspect of the anterior horn of the lateral meniscus.    The arthroscope was placed into the medial portal and a 6-9 guide was placed into the lateral portal we selected our position along the lateral femoral wall.  The osseous length measured 32 mm.  A 10.5 mm flip cutter was then introduced through the lateral wall and a 25 mm socket was reamed.  The flip cutter was placed into the straight position and was replaced with a fiber loop suture.  Arthroscopic visualization showed excellent position of the femoral tunnel.  Excess bone from the reamings was then removed arthroscopically.    The arthroscope was then returned to the lateral portal, a tip to tip guide was introduced.  Our osseous length measured 40 mm.  The 9.5 flip cutter was placed and a 30 mm socket was reamed.  The flip cutter was then returned to the straight position and a fiber loop passing suture was placed.    At this time alternating between a biter and a shaver partial medial meniscectomy was completed until about stable rim of meniscus remained    The medial portal was enlarged.  We confirm that there were no  "soft tissue bridges.  The graft was brought up onto the field reduced to the medial portal.  The cortical button was deployed over the lateral cortex.  Approximately 20 mm of graft was then reduced into the femoral tunnel.  The remainder of the graft was reduced in the tibia.  We then \"balanced\" the graft within the knee joint with 20 mm of graft in the femoral side, 20 mm of graft in the tibial side.  Tensioning and retensioning was then performed in full extension.  Final knot-tying was performed on the tibia and the femoral side.  Examination showed Lachman of 0, no pivot shift. Final arthroscopic images showed good position of the graft, good tension to probing, clearance along the roof of the intercondylar notch in terminal extension clearance along the lateral wall and PCL in flexion.    Copious irrigation was performed an a layered closure was initiated, sterile dressings were applied and the patient was transferred to the recovery room in stable condition with stable vital signs.    ESTIMATED BLOOD LOSS: 25 mL.    TOURNIQUET TIME: No tourniquet was placed.    COMPLICATIONS: None apparent.    DRAINS: None.    SPECIMENS: None.     POSTOPERATIVE PLAN:  Weightbearing as tolerated, wean from crutches when able  Knee immobilizer times 1 week then wean when able  Average time to wean from crutches and brace is 2-3 weeks  The goal is to walk into my clinic at 6 weeks with no brace and no crutches  No running until 3 months  No sports until 6 months, return to game competition at 7-10 months  Shower on day 3  Start physical therapy day 3-5    "

## 2023-03-07 NOTE — ANESTHESIA CARE TRANSFER NOTE
Patient: Padilla Damon    Procedure: Procedure(s):  left knee examination under anesthesia, knee arthroscopy, anterior cruciate ligament reconstruction hamstring autograft,  meniscus surgery       Diagnosis: Chronic pain of left knee [M25.562, G89.29]  Diagnosis Additional Information: No value filed.    Anesthesia Type:   General     Note:    Oropharynx: oropharynx clear of all foreign objects and spontaneously breathing  Level of Consciousness: awake  Oxygen Supplementation: nasal cannula  Level of Supplemental Oxygen (L/min / FiO2): 2  Independent Airway: airway patency satisfactory and stable  Dentition: dentition unchanged  Vital Signs Stable: post-procedure vital signs reviewed and stable  Report to RN Given: handoff report given  Patient transferred to: PACU    Handoff Report: Identifed the Patient, Identified the Reponsible Provider, Reviewed the pertinent medical history, Discussed the surgical course, Reviewed Intra-OP anesthesia mangement and issues during anesthesia, Set expectations for post-procedure period and Allowed opportunity for questions and acknowledgement of understanding      Vitals:  Vitals Value Taken Time   /67    Temp     Pulse 83    Resp 12    SpO2 99        Electronically Signed By: BLAS Tsai CRNA  March 7, 2023  9:43 AM

## 2023-03-07 NOTE — ANESTHESIA PROCEDURE NOTES
Adductor canal Procedure Note    Pre-Procedure   Staff -        Anesthesiologist:  Abdi Sanchez MD       Resident/Fellow: Fidel Lujan MD       Performed By: resident       Location: pre-op       Procedure Start/Stop Times: 3/7/2023 7:51 AM and 3/7/2023 7:57 AM       Pre-Anesthestic Checklist: patient identified, IV checked, site marked, risks and benefits discussed, informed consent, monitors and equipment checked, pre-op evaluation, at physician/surgeon's request and post-op pain management  Timeout:       Correct Patient: Yes        Correct Procedure: Yes        Correct Site: Yes        Correct Position: Yes        Correct Laterality: Yes        Site Marked: Yes  Procedure Documentation  Procedure: Adductor canal       Diagnosis: POST OPERATIVE PAIN       Laterality: left       Patient Position: supine       Patient Prep/Sterile Barriers: sterile gloves, mask       Skin prep: Chloraprep       Needle Type: short bevel       Needle Gauge: 21.        Needle Length (millimeters): 110        Ultrasound guided       1. Ultrasound was used to identify targeted nerve, plexus, vascular marker, or fascial plane and place a needle adjacent to it in real-time.       2. Ultrasound was used to visualize the spread of anesthetic in close proximity to the above referenced structure.       3. A permanent image is entered into the patient's record.    Assessment/Narrative         The placement was negative for: blood aspirated, painful injection and site bleeding       Paresthesias: No.       Bolus given via needle..        Secured via.        Insertion/Infusion Method: Single Shot       Complications: none       Injection made incrementally with aspirations every 5 mL.    Medication(s) Administered   Bupivacaine 0.25% PF (Infiltration) - Infiltration   10 mL - 3/7/2023 7:51:00 AM  Bupivacaine liposome (Exparel) 1.3% LA inj susp (Infiltration) - Infiltration   10 mL - 3/7/2023 7:51:00 AM  Medication Administration Time:  "3/7/2023 7:51 AM     Comments:  133mg of exparel used for this nerve blockade      FOR [facility] ONLY:   Pain Team Contact information: please page the Pain Team Via [employer]. Search \"Pain\". During daytime hours, please page the attending first. At night please page the resident first.    "

## 2023-03-08 ENCOUNTER — TELEPHONE (OUTPATIENT)
Dept: ORTHOPEDICS | Facility: CLINIC | Age: 53
End: 2023-03-08
Payer: COMMERCIAL

## 2023-03-08 NOTE — TELEPHONE ENCOUNTER
[facility] Call Center    Phone Message    May a detailed message be left on voicemail: yes     Reason for Call: Other: Patient had surgery yesterday (3/7) and wanted to know if he is able to add either OTC or prescribed Naproxen to his regimen as patient has both?     Furthermore, he would also like to schedule his post-op for stitches removal.    Action Taken: Message routed to:  [facility] ([facility] ortho    Travel Screening: Not Applicable

## 2023-03-08 NOTE — TELEPHONE ENCOUNTER
S/p Left knee arthroscopic EUA, ACl reconstruction with hamstring autograft, partal medial meniscectomy, DOS: 3/7/23    Pt is currently taking aspirin for DVT prophylaxis and it is not advised to take any NSAID's while taking this as it may interfere with the affect on the platelets. We did discuss possible timing of naproxen while also taking aspirin. We worked out a good admin plan and Pt will try this.     Pt already has post op visit scheduled with MARK Patiño on 3/15/23.     Called Pt back to discuss above.     Julien Riggs RN

## 2023-03-10 ENCOUNTER — THERAPY VISIT (OUTPATIENT)
Dept: PHYSICAL THERAPY | Facility: CLINIC | Age: 53
End: 2023-03-10
Attending: ORTHOPAEDIC SURGERY
Payer: COMMERCIAL

## 2023-03-10 DIAGNOSIS — M25.562 CHRONIC PAIN OF LEFT KNEE: ICD-10-CM

## 2023-03-10 DIAGNOSIS — G89.29 CHRONIC PAIN OF LEFT KNEE: ICD-10-CM

## 2023-03-10 DIAGNOSIS — M25.562 ACUTE PAIN OF LEFT KNEE: ICD-10-CM

## 2023-03-10 DIAGNOSIS — Z47.89 AFTERCARE FOLLOWING SURGERY OF THE MUSCULOSKELETAL SYSTEM: ICD-10-CM

## 2023-03-10 PROCEDURE — 97161 PT EVAL LOW COMPLEX 20 MIN: CPT | Mod: GP | Performed by: PHYSICAL THERAPIST

## 2023-03-10 PROCEDURE — 97110 THERAPEUTIC EXERCISES: CPT | Mod: GP | Performed by: PHYSICAL THERAPIST

## 2023-03-10 PROCEDURE — 97530 THERAPEUTIC ACTIVITIES: CPT | Mod: GP | Performed by: PHYSICAL THERAPIST

## 2023-03-10 ASSESSMENT — ACTIVITIES OF DAILY LIVING (ADL)
WEAKNESS: THE SYMPTOM AFFECTS MY ACTIVITY SLIGHTLY
SWELLING: THE SYMPTOM AFFECTS MY ACTIVITY SLIGHTLY
STIFFNESS: THE SYMPTOM AFFECTS MY ACTIVITY SLIGHTLY
PAIN: THE SYMPTOM AFFECTS MY ACTIVITY MODERATELY
LIMPING: THE SYMPTOM AFFECTS MY ACTIVITY MODERATELY
GIVING WAY, BUCKLING OR SHIFTING OF KNEE: I DO NOT HAVE THE SYMPTOM

## 2023-03-11 PROBLEM — M25.562 ACUTE PAIN OF LEFT KNEE: Status: ACTIVE | Noted: 2023-03-11

## 2023-03-11 PROBLEM — Z47.89 AFTERCARE FOLLOWING SURGERY OF THE MUSCULOSKELETAL SYSTEM: Status: ACTIVE | Noted: 2023-03-11

## 2023-03-11 NOTE — PROGRESS NOTES
Physical Therapy Initial Evaluation    Physical Therapy Initial Examination/Evaluation  March 10, 2023    Padilla Damon  is a 52 year old  male referred to physical therapy by Dr. Aureliano Krause for treatment s/p L ACL reconstruction.      DOI/onset 2019  Mechanism of injury Patient initially sustained the injury in 2019 in martial arts. He was doing well non surgically until an incident 6 months ago where the knee gave out during a martial arts maneuver prompting him to go forward with surgery.  DOS 33-7-23  Prior treatment none.    Chief Complaint:   Limited function and mobility typical post operatively.   Pain location: entire L knee joint  Quality: aching  Constant/Intermittent: constant  Time of day: no time pattern  Symptoms have improved some since onset.    Current pain 3/10.  Pain at best 2/10.  Pain at worst 4/10.    Symptoms aggravated by bending knee, walking.    Symptoms improved with ice and meds.     Social history:  . 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    Patient having difficulty with ADLs: dressing, grooming.    Patient's goals are to reduce pain and eventually restore normal function and physical activity..    Patient reports general health as excellent.  Related medical history no previous problems with L knee.    Surgical History:  none.    Imaging: none since surgery.    Medications:  Pain meds.      Return to MD:  3-16-23.      Clinical Impression: Patient should progress well with continued PT intervention working to restore normal ROM and strength through therapeutic exercise, neuromuscular re education, and therapeutic activities as tolerated per MD protocol.    Subjective:  HPI                    Objective:  Standing Alignment:              Knee:  Normal      Gait:  Moderate limp on L w/out AD  Gait Type:  Antalgic   Weight Bearing Status:  WBAT   Assistive Devices:  Crutches      Flexibility/Screens:    Positive screens:  Knee    Lower Extremity:  Decreased left lower extremity flexibility:Quadriceps; Hamstrings and Gastroc                                                        Knee Evaluation:  ROM:    AROM    Hyperextension: Left:  0    Right:  Extension: Left: 5    Right:   Flexion: Left: 83   Right:  PROM    Hyperextension: Left: 0   Right:   Extension: Left: 2   Right:   Flexion: Left: 98   Right:       Strength:     Extension:  Left: 4-/5   Pain:        Flexion:  Left: 4-/5   Pain:        Quad Set Left: Fair    Pain:     Ligament Testing:  Not Assessed                Special Tests: Not Assessed      Palpation:    Left knee tenderness present at:  Medial Joint Line; Lateral Joint Line and Incisional    Edema:  Edema of the knee: moderate edema L knee.    Mobility Testing:    Proximal Tib-Fib:  Left: hypomobile              Functional Testing:  not assessed                  General     ROS    Assessment/Plan:    Patient is a 52 year old male with left side knee complaints.    Patient has the following significant findings with corresponding treatment plan.                Diagnosis 1:  S/p L ACL reconstruction  Pain -  hot/cold therapy, self management and education  Decreased ROM/flexibility - manual therapy and therapeutic exercise  Decreased strength - therapeutic exercise and therapeutic activities  Edema - cold therapy  Impaired gait - gait training  Impaired muscle performance - neuro re-education  Decreased function - therapeutic activities    Therapy Evaluation Codes:   1) History comprised of:   Personal factors that impact the plan of care:      None.    Comorbidity factors that impact the plan of care are:      None.     Medications impacting care: None.  2) Examination of Body Systems comprised of:   Body structures and functions that impact the plan of care:      Knee.   Activity limitations that impact the plan of care are:      Stairs and Walking.  3) Clinical presentation characteristics  are:   Stable/Uncomplicated.  4) Decision-Making    Low complexity using standardized patient assessment instrument and/or measureable assessment of functional outcome.  Cumulative Therapy Evaluation is: Low complexity.    Previous and current functional limitations:  (See Goal Flow Sheet for this information)    Short term and Long term goals: (See Goal Flow Sheet for this information)     Communication ability:  Patient appears to be able to clearly communicate and understand verbal and written communication and follow directions correctly.  Treatment Explanation - The following has been discussed with the patient:   RX ordered/plan of care  Anticipated outcomes  Possible risks and side effects  This patient would benefit from PT intervention to resume normal activities.   Rehab potential is good.    Frequency:  1 X week, once daily  Duration:  for 6 weeks tapering to 2 X a month over 6 weeks  Discharge Plan:  Achieve all LTG.  Independent in home treatment program.  Reach maximal therapeutic benefit.    Please refer to the daily flowsheet for treatment today, total treatment time and time spent performing 1:1 timed codes.

## 2023-03-12 NOTE — ANESTHESIA POSTPROCEDURE EVALUATION
Patient: Padilla Damon    Procedure: Procedure(s):  left knee examination under anesthesia, knee arthroscopy, anterior cruciate ligament reconstruction hamstring autograft,  meniscus surgery       Anesthesia Type:  General    Note:  Disposition: Outpatient   Postop Pain Control: Uneventful            Sign Out: Well controlled pain   PONV: No   Neuro/Psych: Uneventful            Sign Out: Acceptable/Baseline neuro status   Airway/Respiratory: Uneventful            Sign Out: Acceptable/Baseline resp. status   CV/Hemodynamics: Uneventful            Sign Out: Acceptable CV status; No obvious hypovolemia; No obvious fluid overload   Other NRE: NONE   DID A NON-ROUTINE EVENT OCCUR? No           Last vitals:  Vitals Value Taken Time   /73 03/07/23 1015   Temp 36.3  C (97.4  F) 03/07/23 1015   Pulse 56 03/07/23 1015   Resp 14 03/07/23 1015   SpO2 99 % 03/07/23 1015       Electronically Signed By: Abdi Sanchez MD  March 12, 2023  11:56 AM

## 2023-03-13 NOTE — PROGRESS NOTES
Physical Therapy Initial Evaluation  Subjective:    Patient Health History             Pertinent medical history includes: asthma.     Medical allergies: none.   Surgeries include:  Orthopedic surgery (knee 3-7-23).    Current medications:  Pain medication.    Current occupation is Non Profit Executive.   Primary job tasks include:  Computer work.                                    Objective:  System    Physical Exam    General     ROS    Assessment/Plan:

## 2023-03-15 ENCOUNTER — OFFICE VISIT (OUTPATIENT)
Dept: ORTHOPEDICS | Facility: CLINIC | Age: 53
End: 2023-03-15
Payer: COMMERCIAL

## 2023-03-15 DIAGNOSIS — Z48.89 ENCOUNTER FOR POSTOPERATIVE CARE: Primary | ICD-10-CM

## 2023-03-15 PROCEDURE — 99024 POSTOP FOLLOW-UP VISIT: CPT

## 2023-03-15 NOTE — NURSING NOTE
Reason For Visit:   Chief Complaint   Patient presents with     Surgical Followup     1 week POP Left ACL reconstrucion DOS 3/7/23 with Dr. Krause       There were no vitals taken for this visit.    Pain Assessment  Patient Currently in Pain: Yes  0-10 Pain Scale: 2  Primary Pain Location: Knee (Left)  Alleviating Factors: Rest  Aggravating Factors: Movement, Walking    Nichole Donovan ATC

## 2023-03-15 NOTE — PROGRESS NOTES
Chief Complaint:   1. Follow up, DOS 3/7/23 with Dr. Krause    Procedures:  1. Examination under anesthesia left knee  2. Left knee arthroscopy  3. ACL reconstruction hamstring autograft  4. Partial medial meniscectomy left knee    History:  Padilla Damon is a 52 year old patient s/p above procedure, here for follow up. He has done very well since surgery. Pain is controlled without opioid pain medication. Taking ASA for DVT prophylaxis as prescribed. Working with PT, has future visits scheduled. Back to working from home. No concerns today.       Exam:     General: Awake, Alert, and oriented. Articulates and communicates with a normal affect     Left Lower Extremity:    Incisions well healed without evidence of infection, no erythema, drainage, or wound dehiscence     Normal post-operative effusion and ecchymosis    Range of motion and stability exam not performed    TA/Gsc/EHL/FHL with 5/5 strength    Distal pulses palpable, toes are warm and well perfused       Imaging:  No new imaging.     Medications:     Current Outpatient Medications:      acetaminophen (TYLENOL) 325 MG tablet, Take 2 tablets (650 mg) by mouth every 4 hours as needed for mild pain, Disp: 50 tablet, Rfl: 0     aspirin (ASA) 81 MG EC tablet, Take 1 tablet (81 mg) by mouth daily, Disp: 28 tablet, Rfl: 0     senna-docusate (SENOKOT-S/PERICOLACE) 8.6-50 MG tablet, Take 1-2 tablets by mouth 2 times daily, Disp: 30 tablet, Rfl: 0     hydrOXYzine (ATARAX) 25 MG tablet, Take 1 tablet (25 mg) by mouth 3 times daily as needed for itching (Patient not taking: Reported on 3/15/2023), Disp: 20 tablet, Rfl: 0     ondansetron (ZOFRAN ODT) 4 MG ODT tab, Take 1 tablet (4 mg) by mouth every 8 hours as needed for nausea (Patient not taking: Reported on 3/15/2023), Disp: 4 tablet, Rfl: 0     oxyCODONE (ROXICODONE) 5 MG tablet, Take 1-2 tablets (5-10 mg) by mouth every 4 hours as needed for moderate to severe pain (Patient not taking: Reported on 3/15/2023),  Disp: 20 tablet, Rfl: 0    Assessment:  Doing well 1 weeks s/p left knee arthroscopy, ACL reconstruction with hamstring autograft, partial medial meniscectomy with Dr. Krause. Basic wound cares were performed today, I did not appreciate signs of infection. We discussed the plan below, all questions were answered to the best of my ability.     Plan:   -Weight bearing: WBAT, ok to wean from knee immobilizer and crutches  -Range of motion as tolerated   -Continue work with physical therapy  -DVT prophylaxis:  mg daily x 4 weeks  -Follow up: 6 weeks with Dr. Jimi Barrios PA-C 3/15/2023 4:13 PM  Orthopedic Surgery

## 2023-03-15 NOTE — LETTER
3/15/2023         RE: Padilla Damon  [address] [place] [id number]        Dear Colleague,    Thank you for referring your patient, Padilla Damon, to the [facility] [place]. Please see a copy of my visit note below.    Chief Complaint:   1. Follow up, DOS 3/7/23 with Dr. Krause    Procedures:  1. Examination under anesthesia left knee  2. Left knee arthroscopy  3. ACL reconstruction hamstring autograft  4. Partial medial meniscectomy left knee    History:  Padilla Damon is a 52 year old patient s/p above procedure, here for follow up. He has done very well since surgery. Pain is controlled without opioid pain medication. Taking ASA for DVT prophylaxis as prescribed. Working with PT, has future visits scheduled. Back to working from home. No concerns today.       Exam:     General: Awake, Alert, and oriented. Articulates and communicates with a normal affect     Left Lower Extremity:    Incisions well healed without evidence of infection, no erythema, drainage, or wound dehiscence     Normal post-operative effusion and ecchymosis    Range of motion and stability exam not performed    TA/Gsc/EHL/FHL with 5/5 strength    Distal pulses palpable, toes are warm and well perfused       Imaging:  No new imaging.     Medications:     Current Outpatient Medications:      acetaminophen (TYLENOL) 325 MG tablet, Take 2 tablets (650 mg) by mouth every 4 hours as needed for mild pain, Disp: 50 tablet, Rfl: 0     aspirin (ASA) 81 MG EC tablet, Take 1 tablet (81 mg) by mouth daily, Disp: 28 tablet, Rfl: 0     senna-docusate (SENOKOT-S/PERICOLACE) 8.6-50 MG tablet, Take 1-2 tablets by mouth 2 times daily, Disp: 30 tablet, Rfl: 0     hydrOXYzine (ATARAX) 25 MG tablet, Take 1 tablet (25 mg) by mouth 3 times daily as needed for itching (Patient not taking: Reported on 3/15/2023), Disp: 20 tablet, Rfl: 0     ondansetron (ZOFRAN ODT) 4 MG ODT tab, Take 1 tablet (4 mg) by mouth every 8 hours as  needed for nausea (Patient not taking: Reported on 3/15/2023), Disp: 4 tablet, Rfl: 0     oxyCODONE (ROXICODONE) 5 MG tablet, Take 1-2 tablets (5-10 mg) by mouth every 4 hours as needed for moderate to severe pain (Patient not taking: Reported on 3/15/2023), Disp: 20 tablet, Rfl: 0    Assessment:  Doing well 1 weeks s/p left knee arthroscopy, ACL reconstruction with hamstring autograft, partial medial meniscectomy with Dr. Krause. Basic wound cares were performed today, I did not appreciate signs of infection. We discussed the plan below, all questions were answered to the best of my ability.     Plan:   -Weight bearing: WBAT, ok to wean from knee immobilizer and crutches  -Range of motion as tolerated   -Continue work with physical therapy  -DVT prophylaxis:  mg daily x 4 weeks  -Follow up: 6 weeks with Dr. Jimi Barrios PA-C 3/15/2023 4:13 PM  Orthopedic Surgery

## 2023-03-17 ENCOUNTER — THERAPY VISIT (OUTPATIENT)
Dept: PHYSICAL THERAPY | Facility: CLINIC | Age: 53
End: 2023-03-17
Attending: ORTHOPAEDIC SURGERY
Payer: COMMERCIAL

## 2023-03-17 DIAGNOSIS — M25.562 ACUTE PAIN OF LEFT KNEE: Primary | ICD-10-CM

## 2023-03-17 DIAGNOSIS — Z47.89 AFTERCARE FOLLOWING SURGERY OF THE MUSCULOSKELETAL SYSTEM: ICD-10-CM

## 2023-03-17 PROCEDURE — 97530 THERAPEUTIC ACTIVITIES: CPT | Mod: GP | Performed by: PHYSICAL THERAPIST

## 2023-03-17 PROCEDURE — 97110 THERAPEUTIC EXERCISES: CPT | Mod: GP | Performed by: PHYSICAL THERAPIST

## 2023-04-03 ENCOUNTER — THERAPY VISIT (OUTPATIENT)
Dept: PHYSICAL THERAPY | Facility: CLINIC | Age: 53
End: 2023-04-03
Payer: COMMERCIAL

## 2023-04-03 DIAGNOSIS — M25.562 ACUTE PAIN OF LEFT KNEE: Primary | ICD-10-CM

## 2023-04-03 DIAGNOSIS — Z47.89 AFTERCARE FOLLOWING SURGERY OF THE MUSCULOSKELETAL SYSTEM: ICD-10-CM

## 2023-04-03 PROCEDURE — 97110 THERAPEUTIC EXERCISES: CPT | Mod: GP | Performed by: PHYSICAL THERAPIST

## 2023-04-03 PROCEDURE — 97530 THERAPEUTIC ACTIVITIES: CPT | Mod: GP | Performed by: PHYSICAL THERAPIST

## 2023-04-03 NOTE — PROGRESS NOTES
Subjective:  HPI  Physical Exam                    Objective:  System    Physical Exam    General     ROS    Assessment/Plan:    SUBJECTIVE  Subjective changes as noted by pt:   Pt. cont to make excellent progress in PT with decreased L knee pain and much improved strength and ROM.   Current pain level:  2/10   Changes in function:  Yes (See Goal flowsheet attached for changes in current functional level)     Adverse reaction to treatment or activity:  None    OBJECTIVE  Changes in objective findings:  AROM L 0-1-142. PROM L 0-0-150. Amb - no limp noted on L     ASSESSMENT  Padilla continues to require intervention to meet STG and LTG's: PT  Patient's symptoms are resolving.  Response to therapy has shown an improvement in  pain level, ROM , strength and gait  Progress made towards STG/LTG?  Yes (See Goal flowsheet attached for updates on achievement of STG and LTG)    PLAN  Current treatment program is being advanced to more complex exercises.    PTA/ATC plan:  N/A    Please refer to the daily flowsheet for treatment today, total treatment time and time spent performing 1:1 timed codes.

## 2023-04-14 DIAGNOSIS — Z98.890 S/P ACL RECONSTRUCTION: Primary | ICD-10-CM

## 2023-04-19 ENCOUNTER — TELEPHONE (OUTPATIENT)
Dept: ORTHOPEDICS | Facility: CLINIC | Age: 53
End: 2023-04-19
Payer: COMMERCIAL

## 2023-04-19 NOTE — TELEPHONE ENCOUNTER
It was noted that Padilla missed his appointment with Dr. Krause 2 days ago to follow up on his left knee. Call back number for Padilla to call back and rescheduled this appointment.  Lisa Dewey ATC

## 2023-05-15 ENCOUNTER — OFFICE VISIT (OUTPATIENT)
Dept: ORTHOPEDICS | Facility: CLINIC | Age: 53
End: 2023-05-15
Payer: COMMERCIAL

## 2023-05-15 ENCOUNTER — ANCILLARY PROCEDURE (OUTPATIENT)
Dept: GENERAL RADIOLOGY | Facility: CLINIC | Age: 53
End: 2023-05-15
Attending: ORTHOPAEDIC SURGERY
Payer: COMMERCIAL

## 2023-05-15 VITALS — HEIGHT: 69 IN | WEIGHT: 145 LBS | BODY MASS INDEX: 21.48 KG/M2

## 2023-05-15 DIAGNOSIS — Z98.890 S/P ACL RECONSTRUCTION: ICD-10-CM

## 2023-05-15 DIAGNOSIS — M25.562 CHRONIC PAIN OF LEFT KNEE: Primary | ICD-10-CM

## 2023-05-15 DIAGNOSIS — G89.29 CHRONIC PAIN OF LEFT KNEE: Primary | ICD-10-CM

## 2023-05-15 PROCEDURE — 73560 X-RAY EXAM OF KNEE 1 OR 2: CPT | Mod: LT | Performed by: RADIOLOGY

## 2023-05-15 PROCEDURE — 99024 POSTOP FOLLOW-UP VISIT: CPT | Performed by: ORTHOPAEDIC SURGERY

## 2023-05-15 NOTE — LETTER
5/15/2023         RE: Padilla Damno  [address] [facility] [facility] [id number]        Dear Colleague,    Thank you for referring your patient, Padilla Damon, to the [facility] [place]. Please see a copy of my visit note below.    Diagnosis: Left ACL tear, medial meniscus tear    Procedures:  Examination under anesthesia left knee  Left knee arthroscopy  ACL reconstruction hamstring autograft  Partial medial meniscectomy left knee    DOS 3/7/23    History:  Padilla is now 10 weeks status post the above.  Doing very well.  Has made excellent progress admits to doing a little bit running this morning since his wife was using the exercise bike he used the treadmill.  He states that his knee has been feeling well to him.  He has not returned to his desired activity of martial arts yet.      Exam:     General: Awake, Alert, and oriented. Articulates and communicates with a normal affect     Left Lower Extremity:  Incisions well healed without evidence of infection, no erythema, drainage, or wound dehiscence   No post-operative effusion or ecchymosis  Range of motion  Lachman 0, no pivot shift  TA/Gsc/EHL/FHL with 5/5 strength      Imaging:  No new imaging.     Assessment:  10 weeks status post ACL reconstruction hamstring autograft, medial meniscectomy.    Plan:   Weightbearing: WBAT  Range of Motion: No range of motion restrictions.   Acitivity Restrictions:  May do straight line running at this time  No running distance or pace restrictions  No cutting, pivoting, or start-stop running  Goal to build strength, endurance, and confidence to allow sports in 3 months time (6 months from the date of surgery)  Brace: Discussed knee bracing options for sports including neoprene knee sleeve ACL functional bracing.   Discussed post-ACL reconstruction therapy program  Follow up: 3 months no XRays with an ACL functional test as completed by physical therapy.                Again, thank you for allowing me  to participate in the care of your patient.        Sincerely,        Aureliano Krause MD

## 2023-05-15 NOTE — NURSING NOTE
"Reason For Visit:   Chief Complaint   Patient presents with     RECHECK     DOS: 3/7/23 left knee arthroscopy, ACL reconstruction hamstring autograft, meniscus surgery     Date of surgery: 3/7/23  Type of surgery:   PROCEDURE:  1. Examination under anesthesia left knee  2. Left knee arthroscopy  3. ACL reconstruction hamstring autograft  4.   Partial medial meniscectomy left knee      SANE Score  Left Knee: 75-80  Right Knee: 95    Pain Assessment  Patient Currently in Pain: Denies  Primary Pain Location: Knee    Ht 1.753 m (5' 9\")   Wt 65.8 kg (145 lb)   BMI 21.41 kg/m         No Known Allergies    Current Outpatient Medications   Medication     acetaminophen (TYLENOL) 325 MG tablet     aspirin (ASA) 81 MG EC tablet     hydrOXYzine (ATARAX) 25 MG tablet     ondansetron (ZOFRAN ODT) 4 MG ODT tab     oxyCODONE (ROXICODONE) 5 MG tablet     senna-docusate (SENOKOT-S/PERICOLACE) 8.6-50 MG tablet     No current facility-administered medications for this visit.         Mary Castillo, ATC    "

## 2023-05-15 NOTE — PROGRESS NOTES
Diagnosis: Left ACL tear, medial meniscus tear    Procedures:  1. Examination under anesthesia left knee  2. Left knee arthroscopy  3. ACL reconstruction hamstring autograft  4. Partial medial meniscectomy left knee    DOS 3/7/23    History:  Padilla is now 10 weeks status post the above.  Doing very well.  Has made excellent progress admits to doing a little bit running this morning since his wife was using the exercise bike he used the treadmill.  He states that his knee has been feeling well to him.  He has not returned to his desired activity of martial arts yet.      Exam:     General: Awake, Alert, and oriented. Articulates and communicates with a normal affect     Left Lower Extremity:    Incisions well healed without evidence of infection, no erythema, drainage, or wound dehiscence     No post-operative effusion or ecchymosis    Range of motion    Lachman 0, no pivot shift    TA/Gsc/EHL/FHL with 5/5 strength      Imaging:  No new imaging.     Assessment:  10 weeks status post ACL reconstruction hamstring autograft, medial meniscectomy.    Plan:     Weightbearing: WBAT    Range of Motion: No range of motion restrictions.     Acitivity Restrictions:    May do straight line running at this time    No running distance or pace restrictions    No cutting, pivoting, or start-stop running    Goal to build strength, endurance, and confidence to allow sports in 3 months time (6 months from the date of surgery)    Brace: Discussed knee bracing options for sports including neoprene knee sleeve ACL functional bracing.     Discussed post-ACL reconstruction therapy program    Follow up: 3 months no XRays with an ACL functional test as completed by physical therapy.

## 2023-06-04 ENCOUNTER — HEALTH MAINTENANCE LETTER (OUTPATIENT)
Age: 53
End: 2023-06-04

## 2024-07-13 ENCOUNTER — HEALTH MAINTENANCE LETTER (OUTPATIENT)
Age: 54
End: 2024-07-13

## 2024-08-03 ENCOUNTER — HOSPITAL ENCOUNTER (EMERGENCY)
Facility: CLINIC | Age: 54
Discharge: HOME | End: 2024-08-03
Attending: EMERGENCY MEDICINE | Admitting: EMERGENCY MEDICINE
Payer: COMMERCIAL

## 2024-08-03 VITALS
TEMPERATURE: 98.4 F | RESPIRATION RATE: 20 BRPM | SYSTOLIC BLOOD PRESSURE: 118 MMHG | BODY MASS INDEX: 20.29 KG/M2 | HEIGHT: 69 IN | HEART RATE: 62 BPM | OXYGEN SATURATION: 97 % | DIASTOLIC BLOOD PRESSURE: 70 MMHG | WEIGHT: 137 LBS

## 2024-08-03 DIAGNOSIS — R00.2 PALPITATIONS: ICD-10-CM

## 2024-08-03 LAB
ALBUMIN SERPL BCG-MCNC: 4.6 G/DL (ref 3.5–5.2)
ALP SERPL-CCNC: 72 U/L (ref 40–150)
ALT SERPL W P-5'-P-CCNC: 61 U/L (ref 0–70)
ANION GAP SERPL CALCULATED.3IONS-SCNC: 11 MMOL/L (ref 7–15)
AST SERPL W P-5'-P-CCNC: 34 U/L (ref 0–45)
ATRIAL RATE - MUSE: 66 BPM
BASOPHILS # BLD AUTO: 0 10E3/UL (ref 0–0.2)
BASOPHILS NFR BLD AUTO: 0 %
BILIRUB SERPL-MCNC: 0.3 MG/DL
BUN SERPL-MCNC: 22.4 MG/DL (ref 6–20)
CALCIUM SERPL-MCNC: 9.5 MG/DL (ref 8.8–10.4)
CHLORIDE SERPL-SCNC: 101 MMOL/L (ref 98–107)
CREAT SERPL-MCNC: 0.86 MG/DL (ref 0.67–1.17)
DIASTOLIC BLOOD PRESSURE - MUSE: NORMAL MMHG
EGFRCR SERPLBLD CKD-EPI 2021: >90 ML/MIN/1.73M2
EOSINOPHIL # BLD AUTO: 0 10E3/UL (ref 0–0.7)
EOSINOPHIL NFR BLD AUTO: 0 %
ERYTHROCYTE [DISTWIDTH] IN BLOOD BY AUTOMATED COUNT: 11.3 % (ref 10–15)
GLUCOSE SERPL-MCNC: 127 MG/DL (ref 70–99)
HCO3 SERPL-SCNC: 26 MMOL/L (ref 22–29)
HCT VFR BLD AUTO: 39.5 % (ref 40–53)
HGB BLD-MCNC: 13.7 G/DL (ref 13.3–17.7)
HOLD SPECIMEN: NORMAL
HOLD SPECIMEN: NORMAL
IMM GRANULOCYTES # BLD: 0 10E3/UL
IMM GRANULOCYTES NFR BLD: 0 %
INTERPRETATION ECG - MUSE: NORMAL
LYMPHOCYTES # BLD AUTO: 0.7 10E3/UL (ref 0.8–5.3)
LYMPHOCYTES NFR BLD AUTO: 7 %
MAGNESIUM SERPL-MCNC: 2.2 MG/DL (ref 1.7–2.3)
MCH RBC QN AUTO: 30.8 PG (ref 26.5–33)
MCHC RBC AUTO-ENTMCNC: 34.7 G/DL (ref 31.5–36.5)
MCV RBC AUTO: 89 FL (ref 78–100)
MONOCYTES # BLD AUTO: 0.7 10E3/UL (ref 0–1.3)
MONOCYTES NFR BLD AUTO: 7 %
NEUTROPHILS # BLD AUTO: 9 10E3/UL (ref 1.6–8.3)
NEUTROPHILS NFR BLD AUTO: 86 %
NRBC # BLD AUTO: 0 10E3/UL
NRBC BLD AUTO-RTO: 0 /100
P AXIS - MUSE: 78 DEGREES
PLATELET # BLD AUTO: 187 10E3/UL (ref 150–450)
POTASSIUM SERPL-SCNC: 4.4 MMOL/L (ref 3.4–5.3)
PR INTERVAL - MUSE: 134 MS
PROT SERPL-MCNC: 6.9 G/DL (ref 6.4–8.3)
QRS DURATION - MUSE: 82 MS
QT - MUSE: 384 MS
QTC - MUSE: 402 MS
R AXIS - MUSE: 74 DEGREES
RBC # BLD AUTO: 4.45 10E6/UL (ref 4.4–5.9)
SODIUM SERPL-SCNC: 138 MMOL/L (ref 135–145)
SYSTOLIC BLOOD PRESSURE - MUSE: NORMAL MMHG
T AXIS - MUSE: 66 DEGREES
TROPONIN T SERPL HS-MCNC: 11 NG/L
TSH SERPL DL<=0.005 MIU/L-ACNC: 1.7 UIU/ML (ref 0.3–4.2)
VENTRICULAR RATE- MUSE: 66 BPM
WBC # BLD AUTO: 10.4 10E3/UL (ref 4–11)

## 2024-08-03 PROCEDURE — 83735 ASSAY OF MAGNESIUM: CPT | Performed by: EMERGENCY MEDICINE

## 2024-08-03 PROCEDURE — 85025 COMPLETE CBC W/AUTO DIFF WBC: CPT | Performed by: EMERGENCY MEDICINE

## 2024-08-03 PROCEDURE — 80053 COMPREHEN METABOLIC PANEL: CPT | Performed by: EMERGENCY MEDICINE

## 2024-08-03 PROCEDURE — 36415 COLL VENOUS BLD VENIPUNCTURE: CPT | Performed by: EMERGENCY MEDICINE

## 2024-08-03 PROCEDURE — 84484 ASSAY OF TROPONIN QUANT: CPT | Performed by: EMERGENCY MEDICINE

## 2024-08-03 PROCEDURE — 93005 ELECTROCARDIOGRAM TRACING: CPT

## 2024-08-03 PROCEDURE — 99284 EMERGENCY DEPT VISIT MOD MDM: CPT

## 2024-08-03 PROCEDURE — 84443 ASSAY THYROID STIM HORMONE: CPT | Performed by: EMERGENCY MEDICINE

## 2024-08-03 ASSESSMENT — COLUMBIA-SUICIDE SEVERITY RATING SCALE - C-SSRS
2. HAVE YOU ACTUALLY HAD ANY THOUGHTS OF KILLING YOURSELF IN THE PAST MONTH?: NO
6. HAVE YOU EVER DONE ANYTHING, STARTED TO DO ANYTHING, OR PREPARED TO DO ANYTHING TO END YOUR LIFE?: NO
1. IN THE PAST MONTH, HAVE YOU WISHED YOU WERE DEAD OR WISHED YOU COULD GO TO SLEEP AND NOT WAKE UP?: NO

## 2024-08-03 ASSESSMENT — ACTIVITIES OF DAILY LIVING (ADL)
ADLS_ACUITY_SCORE: 35
ADLS_ACUITY_SCORE: 33

## 2024-08-04 NOTE — DISCHARGE INSTRUCTIONS
Stay hydrated.    Please return to the emergency department as needed for new or worsening symptoms including fainting, worsening chest pain, new or worsening shortness of breath, any other concerning symptoms.    Discharge Instructions  Palpitations    Palpitations are an unusual awareness of your heartbeat. People often describe this as the heart skipping, fluttering, racing, irregular, or pounding. At this time, your provider has found no signs that your palpitations are due to a serious or life-threatening condition. However, sometimes there is a serious problem that does not show up right away.    Palpitations can be caused by caffeine, cigarettes, diet pills, energy drinks or supplements, other stimulants, and medications and street drugs. They can also be caused by anxiety, hormone conditions such as high thyroid, and other medical conditions. Sometimes they are a sign of abnormal rhythm in the heart. At this time, your provider did not find any dangerous cause of your symptoms.    Generally, every Emergency Department visit should have a follow-up clinic visit with either a primary or a specialty clinic/provider. Please follow-up as instructed by your emergency provider today.    Return to the Emergency Department if:  You get chest pain or tightness.  You are short of breath.  You get very weak or tired.  You pass out or faint.  Your heart rate is over 120 beats per minute for more than 10 minutes while you are resting.   You have anything else that worries you.    What can I do to help myself?  Fill any prescriptions the provider gave you and take them right away.   Follow your provider s instructions about the prescription medicines you are on. Sometimes the provider may tell you to stop taking a medicine or change the dose.  If you smoke, this may be a good time to quit! The less you can smoke, the better.  Do not use energy drinks, diet pills, or stimulants. Limit your use of caffeine.  If you were  given a prescription for medicine here today, be sure to read all of the information (including the package insert) that comes with your prescription.  This will include important information about the medicine, its side effects, and any warnings that you need to know about.  The pharmacist who fills the prescription can provide more information and answer questions you may have about the medicine.  If you have questions or concerns that the pharmacist cannot address, please call or return to the Emergency Department.     Remember that you can always come back to the Emergency Department if you are not able to see your regular provider in the amount of time listed above, if you get any new symptoms, or if there is anything that worries you.

## 2024-08-04 NOTE — ED TRIAGE NOTES
Pt presents to ED with intermittent palpitations and chest tightness since this am. Denies sob.      Triage Assessment (Adult)       Row Name 08/03/24 1920          Respiratory WDL    Respiratory WDL WDL        Cardiac WDL    Cardiac WDL X  chest tightness and palpitations since this am        Cognitive/Neuro/Behavioral WDL    Cognitive/Neuro/Behavioral WDL WDL

## 2024-08-04 NOTE — ED PROVIDER NOTES
"  Emergency Department Note      History of Present Illness     Chief Complaint   Palpitations    HPI   Padilla Damon is a 54 year old male with a history of asthma who presents for evaluation of palpitations. The patient reports that since his 2.5 mile run around 0830 this morning, he has had palpitations and mild chest discomfort. States that even after he completed his run and rested, he felt his heart was still beating faster. He did drink a caffeinated tea this afternoon. Adds that he received a cortisone injection in his right shoulder for the first time yesterday. He denies exertional chest pain. He denies shortness of breath, lower extremity edema, fever, chills, cough, rhinorrhea, sore throat, orthopnea, and tobacco or alcohol use.     Independent Historian   None    Review of External Notes   None pertinent     Past Medical History     Medical History and Problem List   Inguinal hernia  Chronic knee pain, left  Asthma  Hemangioma     Medications   Aspirin 81 mg    Surgical History   ACL repair, left    Physical Exam     Patient Vitals for the past 24 hrs:   BP Temp Temp src Pulse Resp SpO2 Height Weight   08/03/24 2133 118/70 98.4  F (36.9  C) Oral 62 20 97 % -- --   08/03/24 2100 120/78 -- -- 61 18 -- -- --   08/03/24 2055 120/78 -- -- -- -- 97 % -- --   08/03/24 1919 132/64 97.8  F (36.6  C) -- 69 16 98 % 1.753 m (5' 9\") 62.1 kg (137 lb)     Physical Exam  Constitutional: Well developed, nontox appearance  Head: Atraumatic.   Mouth/Throat: Oropharynx is clear and moist.   Neck:  no stridor  Eyes: no scleral icterus  Cardiovascular: RRR, 2+ bilat radial pulses  Pulmonary/Chest: nml resp effort, Clear BS bilat  Abdominal: ND, soft, NT, no rebound or guarding   : no CVA tenderness bilat  Ext: Warm, well perfused, no edema  Neurological: A&O, symmetric facies, moves ext x4  Skin: Skin is warm and dry.   Psychiatric: Behavior is normal. Thought content normal.   Nursing note and vitals " reviewed.    Diagnostics     Lab Results   Labs Ordered and Resulted from Time of ED Arrival to Time of ED Departure   COMPREHENSIVE METABOLIC PANEL - Abnormal       Result Value    Sodium 138      Potassium 4.4      Carbon Dioxide (CO2) 26      Anion Gap 11      Urea Nitrogen 22.4 (*)     Creatinine 0.86      GFR Estimate >90      Calcium 9.5      Chloride 101      Glucose 127 (*)     Alkaline Phosphatase 72      AST 34      ALT 61      Protein Total 6.9      Albumin 4.6      Bilirubin Total 0.3     CBC WITH PLATELETS AND DIFFERENTIAL - Abnormal    WBC Count 10.4      RBC Count 4.45      Hemoglobin 13.7      Hematocrit 39.5 (*)     MCV 89      MCH 30.8      MCHC 34.7      RDW 11.3      Platelet Count 187      % Neutrophils 86      % Lymphocytes 7      % Monocytes 7      % Eosinophils 0      % Basophils 0      % Immature Granulocytes 0      NRBCs per 100 WBC 0      Absolute Neutrophils 9.0 (*)     Absolute Lymphocytes 0.7 (*)     Absolute Monocytes 0.7      Absolute Eosinophils 0.0      Absolute Basophils 0.0      Absolute Immature Granulocytes 0.0      Absolute NRBCs 0.0     TROPONIN T, HIGH SENSITIVITY - Normal    Troponin T, High Sensitivity 11     MAGNESIUM - Normal    Magnesium 2.2     TSH WITH FREE T4 REFLEX - Normal    TSH 1.70       Imaging   No orders to display     EKG   ECG results from 08/03/24   EKG 12 lead     Value    Systolic Blood Pressure     Diastolic Blood Pressure     Ventricular Rate 66    Atrial Rate 66    WV Interval 134    QRS Duration 82        QTc 402    P Axis 78    R AXIS 74    T Axis 66    Interpretation ECG      Sinus rhythm with sinus arrhythmia  Normal ECG  When compared with ECG of 09-Dec-2020 13:02,  No significant change was found  Confirmed by GENERATED REPORT, COMPUTER ([id number]),  Isha Miller ([id number]) on 8/3/2024 8:25:53 PM           Independent Interpretation   EKG significant for sinus rhythm with sinus arrhythmia, no acute ischemic findings    ED Course       Medications Administered   Medications - No data to display    Procedures   Procedures     Discussion of Management   None    ED Course   ED Course as of 08/03/24 2145   Sat Aug 03, 2024   2116 I obtained history and examined the patient as noted above.        Additional Documentation  None    Medical Decision Making / Diagnosis     CMS Diagnoses: None    MIPS       None    MDM   54 year old male presenting w/ palpitations     DDx includes arrhythmia, electrolyte abnormality, thyroid dysfunction, anxiety.  EKG significant for sinus rhythm with sinus arrhythmia as noted above.  Labs unremarkable with normal hemoglobin, normal elevation of BUN likely of little clinical significance.  No evidence of significant arrhythmia on EKG.  Is possible the patient's recent hydrocortisone injection is contributing to his current palpitations.  Doubt PE given symptomatology and physical exam.  Presentation seems most consistent with palpitations NOS or sinus rhythm with sinus arrhythmia.  Patient advised to stay hydrated at home and follow-up with his PCP as needed.  I do not feel admission or cardiac monitor is necessary at this time but may be indicated in the future should symptoms persist.  At this time I feel the pt is safe for discharge.  Recommendations given regarding follow up with PCP and return to the emergency department as needed for new or worsening symptoms.  Pt counseled on all results, disposition and diagnosis.  They are understanding and agreeable to plan. Patient discharged in stable condition.    Disposition   The patient was discharged.     Diagnosis     ICD-10-CM    1. Palpitations  R00.2           Scribe Disclosure:  I, Yanira Quintero, am serving as a scribe at 8:55 PM on 8/3/2024 to document services personally performed by Josep Ness MD based on my observations and the provider's statements to me.        Josep Ness MD  08/03/24 2146

## 2025-07-19 ENCOUNTER — HEALTH MAINTENANCE LETTER (OUTPATIENT)
Age: 55
End: 2025-07-19

## (undated) DEVICE — GLOVE BIOGEL PI MICRO SZ 6.0 48560

## (undated) DEVICE — POSITIONER ARMBOARD FOAM 1PAIR LF FP-ARMB1

## (undated) DEVICE — SU TIGERSTICK #2 TIGERWIRE 50" STIFF END 12" AR-7209T

## (undated) DEVICE — SUCTION MANIFOLD NEPTUNE 2 SYS 4 PORT 0702-020-000

## (undated) DEVICE — PACK ACL SUPPLEMENT CUSTOM ASC

## (undated) DEVICE — PEN MARKING SKIN W/PAPER RULER 31145785

## (undated) DEVICE — LINEN TOWEL PACK X5 5464

## (undated) DEVICE — TUBING SYSTEM ARTHREX PATIENT REDEUCE AR-6421

## (undated) DEVICE — SU ETHILON 3-0 PS-1 18" 1663H

## (undated) DEVICE — PIN GUIDE ARTHREX 2.4MM DRILL  AR-1250L

## (undated) DEVICE — GLOVE BIOGEL PI MICRO INDICATOR UNDERGLOVE SZ 8.0 48980

## (undated) DEVICE — Device

## (undated) DEVICE — GLOVE BIOGEL PI MICRO SZ 8.0 48580

## (undated) DEVICE — SU VICRYL 0 CT-1 27" J340H

## (undated) DEVICE — SU VICRYL 0 CT-1 27" UND J260H

## (undated) DEVICE — TUBING SUCTION MEDI-VAC 1/4"X20' N620A

## (undated) DEVICE — ESU GROUND PAD ADULT W/CORD E7507

## (undated) DEVICE — FIBERLOOP

## (undated) DEVICE — ARTHREX FLIPCUTTER III DRILL AR-1204FF

## (undated) DEVICE — ABLATOR ARTHREX APOLLO RF MP90 ASPIRATING 90DEG AR-9811

## (undated) DEVICE — GLOVE BIOGEL PI MICRO INDICATOR UNDERGLOVE SZ 6.5 48965

## (undated) DEVICE — SU MONOCRYL 3-0 PS-1 27" Y936H

## (undated) DEVICE — BUR ARTHREX COOLCUT SABRE 4.0MMX13CM AR-8400SR

## (undated) DEVICE — DRSG STERI STRIP 1/2X4" R1547

## (undated) DEVICE — SU VICRYL 2-0 CT-2 27" UND J269H

## (undated) DEVICE — IMM KNEE 16" 79-80110

## (undated) DEVICE — ESU PENCIL SMOKE EVAC W/ROCKER SWITCH 0703-047-000

## (undated) DEVICE — PACK ARTHROSCOPY CUSTOM ASC

## (undated) DEVICE — SOL NACL 0.9% IRRIG 3000ML BAG 2B7477

## (undated) DEVICE — DRAPE TIBURON TOP SHEET 100X60" 29352

## (undated) DEVICE — SU FIBERWIRE #2 FIBERSTICK 50"  AR-7209

## (undated) DEVICE — SU FIBERWIRE 2 38"  AR-7200

## (undated) DEVICE — PAD ARMBOARD FOAM EGGCRATE COVIDEN 3114367

## (undated) DEVICE — LINEN GOWN XLG 5407

## (undated) DEVICE — PREP CHLORAPREP 26ML TINTED ORANGE  260815

## (undated) DEVICE — SU ETHIBOND 1 CT-1 30" X425H

## (undated) DEVICE — LINEN ORTHO PACK 5446

## (undated) RX ORDER — BUPIVACAINE HYDROCHLORIDE 2.5 MG/ML
INJECTION, SOLUTION EPIDURAL; INFILTRATION; INTRACAUDAL
Status: DISPENSED
Start: 2023-03-07

## (undated) RX ORDER — EPHEDRINE SULFATE 50 MG/ML
INJECTION, SOLUTION INTRAMUSCULAR; INTRAVENOUS; SUBCUTANEOUS
Status: DISPENSED
Start: 2023-03-07

## (undated) RX ORDER — EPINEPHRINE 1 MG/ML
INJECTION, SOLUTION INTRAMUSCULAR; SUBCUTANEOUS
Status: DISPENSED
Start: 2023-03-07

## (undated) RX ORDER — DEXAMETHASONE SODIUM PHOSPHATE 4 MG/ML
INJECTION, SOLUTION INTRA-ARTICULAR; INTRALESIONAL; INTRAMUSCULAR; INTRAVENOUS; SOFT TISSUE
Status: DISPENSED
Start: 2023-03-07

## (undated) RX ORDER — PROPOFOL 10 MG/ML
INJECTION, EMULSION INTRAVENOUS
Status: DISPENSED
Start: 2023-03-07

## (undated) RX ORDER — FENTANYL CITRATE 50 UG/ML
INJECTION, SOLUTION INTRAMUSCULAR; INTRAVENOUS
Status: DISPENSED
Start: 2023-03-07

## (undated) RX ORDER — VANCOMYCIN HYDROCHLORIDE 1 G/20ML
INJECTION, POWDER, LYOPHILIZED, FOR SOLUTION INTRAVENOUS
Status: DISPENSED
Start: 2023-03-07

## (undated) RX ORDER — CEFAZOLIN SODIUM 2 G/50ML
SOLUTION INTRAVENOUS
Status: DISPENSED
Start: 2023-03-07

## (undated) RX ORDER — HYDROMORPHONE HYDROCHLORIDE 1 MG/ML
INJECTION, SOLUTION INTRAMUSCULAR; INTRAVENOUS; SUBCUTANEOUS
Status: DISPENSED
Start: 2023-03-07

## (undated) RX ORDER — ONDANSETRON 2 MG/ML
INJECTION INTRAMUSCULAR; INTRAVENOUS
Status: DISPENSED
Start: 2023-03-07

## (undated) RX ORDER — GLYCOPYRROLATE 0.2 MG/ML
INJECTION INTRAMUSCULAR; INTRAVENOUS
Status: DISPENSED
Start: 2023-03-07

## (undated) RX ORDER — KETOROLAC TROMETHAMINE 30 MG/ML
INJECTION, SOLUTION INTRAMUSCULAR; INTRAVENOUS
Status: DISPENSED
Start: 2023-03-07